# Patient Record
Sex: MALE | Race: WHITE | HISPANIC OR LATINO | Employment: FULL TIME | URBAN - METROPOLITAN AREA
[De-identification: names, ages, dates, MRNs, and addresses within clinical notes are randomized per-mention and may not be internally consistent; named-entity substitution may affect disease eponyms.]

---

## 2017-06-11 ENCOUNTER — HOSPITAL ENCOUNTER (EMERGENCY)
Facility: MEDICAL CENTER | Age: 47
End: 2017-06-12
Attending: EMERGENCY MEDICINE

## 2017-06-11 ENCOUNTER — APPOINTMENT (OUTPATIENT)
Dept: RADIOLOGY | Facility: MEDICAL CENTER | Age: 47
End: 2017-06-11
Attending: EMERGENCY MEDICINE

## 2017-06-11 DIAGNOSIS — S09.90XA HEAD TRAUMA, INITIAL ENCOUNTER: ICD-10-CM

## 2017-06-11 DIAGNOSIS — S00.83XA FACIAL CONTUSION, INITIAL ENCOUNTER: ICD-10-CM

## 2017-06-11 DIAGNOSIS — S01.81XA FOREHEAD LACERATION, INITIAL ENCOUNTER: ICD-10-CM

## 2017-06-11 PROCEDURE — 70486 CT MAXILLOFACIAL W/O DYE: CPT

## 2017-06-11 PROCEDURE — 304999 HCHG REPAIR-SIMPLE/INTERMED LEVEL 1

## 2017-06-11 PROCEDURE — 90715 TDAP VACCINE 7 YRS/> IM: CPT | Performed by: EMERGENCY MEDICINE

## 2017-06-11 PROCEDURE — 303747 HCHG EXTRA SUTURE

## 2017-06-11 PROCEDURE — 70450 CT HEAD/BRAIN W/O DYE: CPT

## 2017-06-11 PROCEDURE — 304217 HCHG IRRIGATION SYSTEM

## 2017-06-11 PROCEDURE — 99284 EMERGENCY DEPT VISIT MOD MDM: CPT

## 2017-06-11 PROCEDURE — 90471 IMMUNIZATION ADMIN: CPT

## 2017-06-11 PROCEDURE — 700111 HCHG RX REV CODE 636 W/ 250 OVERRIDE (IP): Performed by: EMERGENCY MEDICINE

## 2017-06-11 PROCEDURE — 303485 HCHG DRESSING MEDIUM

## 2017-06-11 RX ORDER — HYDROCODONE BITARTRATE AND ACETAMINOPHEN 5; 325 MG/1; MG/1
1-2 TABLET ORAL EVERY 4 HOURS PRN
Qty: 16 TAB | Refills: 0 | Status: SHIPPED | OUTPATIENT
Start: 2017-06-11

## 2017-06-11 RX ADMIN — CLOSTRIDIUM TETANI TOXOID ANTIGEN (FORMALDEHYDE INACTIVATED), CORYNEBACTERIUM DIPHTHERIAE TOXOID ANTIGEN (FORMALDEHYDE INACTIVATED), BORDETELLA PERTUSSIS TOXOID ANTIGEN (GLUTARALDEHYDE INACTIVATED), BORDETELLA PERTUSSIS FILAMENTOUS HEMAGGLUTININ ANTIGEN (FORMALDEHYDE INACTIVATED), BORDETELLA PERTUSSIS PERTACTIN ANTIGEN, AND BORDETELLA PERTUSSIS FIMBRIAE 2/3 ANTIGEN 0.5 ML: 5; 2; 2.5; 5; 3; 5 INJECTION, SUSPENSION INTRAMUSCULAR at 23:09

## 2017-06-11 ASSESSMENT — ENCOUNTER SYMPTOMS
ABDOMINAL PAIN: 0
NECK PAIN: 0
LOSS OF CONSCIOUSNESS: 0

## 2017-06-11 NOTE — ED AVS SNAPSHOT
6/12/2017    Jamari Acuna  [address]  [place] [address]    Dear Jamari:    [facility] wants to ensure your discharge home is safe and you or your loved ones have had all of your questions answered regarding your care after you leave the hospital.    Below is a list of resources and contact information should you have any questions regarding your hospital stay, follow-up instructions, or active medical symptoms.    Questions or Concerns Regarding… Contact   Medical Questions Related to Your Discharge  (7 days a week, 8am-5pm) Contact a Nurse Care Coordinator   [phone]   Medical Questions Not Related to Your Discharge  (24 hours a day / 7 days a week)  Contact the Nurse Health Line   [phone]    Medications or Discharge Instructions Refer to your discharge packet   or contact your [facility] Primary Care Provider   [phone]   Follow-up Appointment(s) Schedule your appointment via [employer]   or contact Scheduling [phone]   Billing Review your statement via [employer]  or contact Billing [phone]   Medical Records Review your records via [employer]   or contact Medical Records [phone]     You may receive a telephone call within two days of discharge. This call is to make certain you understand your discharge instructions and have the opportunity to have any questions answered. You can also easily access your medical information, test results and upcoming appointments via the [employer] free online health management tool. You can learn more and sign up at [email or web address]. For assistance setting up your [employer] account, please call [phone].    Once again, we want to ensure your discharge home is safe and that you have a clear understanding of any next steps in your care. If you have any questions or concerns, please do not hesitate to contact us, we are here for you. Thank you for choosing [facility] for your healthcare needs.    Sincerely,    Your [facility] Healthcare Team

## 2017-06-11 NOTE — ED AVS SNAPSHOT
Home Care Instructions                                                                                                                Jamari Acuna   MRN: [id number]    Department:  [facility], Emergency Dept   Date of Visit:  6/11/2017            [facility], Emergency Dept    [address]    [place] [address]    Phone:  [phone]      You were seen by     Krunal Gonzales M.D.      Your Diagnosis Was     Head trauma, initial encounter     S09.90XA       These are the medications you received during your hospitalization from 06/11/2017 2005 to 06/12/2017 0012     Date/Time Order Dose Route Action    06/11/2017 2309 tetanus-dipth-acell pertussis (ADACEL) inj 0.5 mL 0.5 mL Intramuscular Given      Medication Information     Review all of your home medications and newly ordered medications with your primary doctor and/or pharmacist as soon as possible. Follow medication instructions as directed by your doctor and/or pharmacist.     Please keep your complete medication list with you and share with your physician. Update the information when medications are discontinued, doses are changed, or new medications (including over-the-counter products) are added; and carry medication information at all times in the event of emergency situations.               Medication List      START taking these medications        Instructions    Morning Afternoon Evening Bedtime    hydrocodone-acetaminophen 5-325 MG Tabs per tablet   Commonly known as:  NORCO        Take 1-2 Tabs by mouth every four hours as needed.   Dose:  1-2 Tab                          ASK your doctor about these medications        Instructions    Morning Afternoon Evening Bedtime    citalopram 10 MG tablet   Commonly known as:  CELEXA        Take 10 mg by mouth every day.   Dose:  10 mg                        ranitidine 300 MG tablet   Commonly known as:  ZANTAC        Take 1 Tab by mouth every day.   Dose:  300 mg                           Where to Get Your Medications      You can get these medications from any pharmacy     Bring a paper prescription for each of these medications    - hydrocodone-acetaminophen 5-325 MG Tabs per tablet            Procedures and tests performed during your visit     CT-HEAD W/O    CT-MAXILLOFACIAL W/O PLUS RECONS    WOUND IRRIGATION        Discharge Instructions       Contusión   (Contusion)   Janiya contusión es un hematoma interno. Las contusiones ocurren cuando un traumatismo causa un sangrado debajo de la piel. Los signos de hematoma son dolor, inflamación (hinchazón) y cambio de color en la piel. La contusión puede volverse elva, púrpura o amarilla.  CUIDADOS EN EL HOGAR   · Aplique hielo sobre la rodriguez lesionada.  · Ponga el hielo en janiya bolsa plástica.  · Colóquese janiya toalla entre la piel y la bolsa de hielo.  · Deje el hielo mary 15 a 20 minutos, 3 a 4 veces por día.  · Sólo tome los medicamentos según le indique el médico.  · Alecia que la rodriguez lesionada repose.  · En lo posible, levante (eleve) la rodriguez lesionada para disminuir la hinchazón.  SOLICITE AYUDA DE INMEDIATO SI:   · El hematoma o la hinchazón aumentan.  · Siente que el dolor empeora.  · La hinchazón o el dolor no se alivian con los medicamentos.  ASEGÚRESE DE QUE:   · Comprende estas instrucciones.  · Controlará holt enfermedad.  · Solicitará ayuda de inmediato si no mejora o si empeora.     Esta información no tiene roni fin reemplazar el consejo del médico. Asegúrese de hacerle al médico cualquier pregunta que tenga.     Document Released: 12/06/2012 Document Revised: 03/11/2013  Elsevier Interactive Patient Education ©2016 Elsevier Inc.    Laceración facial  (Facial Laceration)   Janiya laceración facial es un gabriella en el araseli. Estas lesiones pueden ser dolorosas y causan sangrado. Generalmente se curan rápido, deuce puede ser necesario un cuidado especial para reducir las cicatrices.  DIAGNÓSTICO   Holt médico realizará la  historia clínica, preguntará detalles sobre cómo ocurrió la lesión y examinará la herida para determinar cuán profundo es el gabriella.  TRATAMIENTO   Algunas laceraciones faciales no requieren sutura. Otras laceraciones quizás no puedan cerrarse debido a un aumento del riesgo de infección. El riesgo de infección y la probabilidad de que la herida se cierre correctamente dependerán de diversos factores, incluido el tiempo transcurrido desde que ocurrió la lesión.  La herida puede limpiarse para ayudar a prevenir janiya infección. Si la herida se matthew adecuadamente, podrán indicarle analgésicos, si los necesita. Holt médico usará puntos (suturas), pegamento para heridas (adhesivo) o tiras adhesivas para la piel para reparar la laceración. Estos elementos mantendrán unidos los bordes de la piel para que se cure más rápidamente y para obtener un mejor resultado cosmético. Si es necesario, es posible que le administren janiya vacuna contra el tétanos.  INSTRUCCIONES PARA EL CUIDADO EN EL HOGAR  · [place] solo medicamentos de venta darian o recetados, según las indicaciones del médico.  · Siga las indicaciones de holt médico para el cuidado de la herida. Estas indicaciones variarán según la técnica utilizada para cerrar la herida.  Si tiene suturas:  · Mantenga la herida limpia y seca.  · Si le colocaron janiya venda (vendaje), debe cambiarla al menos janiya vez al día. Además, cambie el vendaje si mihaela se moja o se ensucia, o según las instrucciones de holt médico.  · Lave la herida dos veces por día con agua y jabón. Enjuáguela con agua para quitar todo el jabón. Seque dando palmaditas con janiya toalla limpia y seca.  · Después de limpiar, aplique janiya delgada capa del ungüento con antibiótico recomendado por holt médico. [place] le ayudará a prevenir las infecciones y a evitar que el vendaje se adhiera.  · Puede ducharse después de las primeras 24 horas. No moje la herida hasta que le hayan quitado las suturas.  · Quítese las suturas según las  indicaciones de holt médico. Con las laceraciones faciales, las suturas normalmente deben retirarse después de 4 a 5 días para evitar las marcas de los puntos.  · Espere algunos días luego de que le hayan retirado las suturas antes de aplicar maquillaje.  En roni que tenga tiras adhesivas para la piel:  · Mantenga la herida limpia y seca.  · No deje que las tiras adhesivas se mojen. Puede darse un baño deuce asegúrese de que la herida se mantenga seca.  · Si se moja, séquela dando palmaditas con jessica toalla limpia.  · Las tiras caerán por sí mismas. Puede recortar las tiras a medida que la herida se adryan. No quite las tiras adhesivas para la piel que aún estén adheridas a la herida. Ellas se caerán cuando sea el momento.  En roni que le hayan aplicado adhesivo:  · Podrá mojar la herida solo por un memento, en la ducha o el baño. No frote ni sumerja la herida. No practique natación. Evite transpirar con abundancia hasta que el adhesivo para la piel se haya caído solo. Después de ducharse o darse un baño, seque el gabriella dando palmaditas con jessica toalla limpia.  · No aplique medicamentos líquidos, en crema o ungüentos ni maquillaje en holt herida mientras el adhesivo para la piel esté en holt lugar. Podrá aflojarlo antes de que la herida se cure.  · Si tiene un vendaje, tenga cuidado de no aplicar cinta adhesiva directamente sobre el adhesivo. Idana puede hacer que el adhesivo se caiga antes de que la herida se haya curado.  · Evite la exposición prolongada a la usama solar o a las lámparas nicolle mientras el adhesivo para la piel se encuentre en el lugar.  · Por lo general, el adhesivo para la piel permanecerá en holt lugar mary 5 a 10 días y luego caerá naturalmente. No quite la película de adhesivo.  Después de la curación:  Jessica vez que la herida se haya curado, proteja la herida del sol mary un año, colocando pantalla solar mary el día. Idana puede ayudar a reducir las cicatrices. La exposición a los bienvenido ultravioletas  mary el primer año oscurecerá la cicatriz. Pueden transcurrir entre jadiel y dos años hasta que la cicatriz se cure completamente y pierda el color chaney.   SOLICITE ATENCIÓN MÉDICA SI:   · Tiene fiebre.  SOLICITE ATENCIÓN MÉDICA DE INMEDIATO SI:  · Tiene enrojecimiento, dolor o hinchazón alrededor de la herida.  · Observa janiya secreción de color murillo amarillento (pus) en la herida.     Esta información no tiene roni fin reemplazar el consejo del médico. Asegúrese de hacerle al médico cualquier pregunta que tenga.    Suture removal in 4-5 days.  Traumatismo en la bri - Adultos  (Head Injury, Adult)  Tiene janiya lesión en la bri. Después de sufrir janiya lesión en la bri, es normal tener michelle de bri y vomitar. Si se duerme, debería resultar fácil despertarlo. Algunas veces debe permanecer en el hospital. La mayoría de los problemas ocurren mary las primeras 24 horas. Los efectos secundarios pueden aparecer entre los 7 y 10 días posteriores a la lesión.   ¿CUÁLES SON LOS TIPOS DE LESIONES EN LA BRI?  Las lesiones en la bri pueden ser leves y provocar un bulto. Algunas lesiones en la bri pueden ser más graves. Algunas de las lesiones graves en la bri son:  · Lesión que provoque un impacto en el cerebro (conmoción).  · Hematoma en el cerebro (contusión). [place]an[place] significa que hay hemorragia en el cerebro que puede causar un edema.  · Fisura en el cráneo (fractura de cráneo).  · Hemorragia en el cerebro que se acumula, se coagula y forma un bulto (hematoma).  ¿CUÁNDO ZACH OBTENER AYUDA DE INMEDIATO?   · Está confundido o somnoliento.  · No pueden despertarlo.  · Tiene malestar estomacal (náuseas) o vómitos.  · Los mareos o la inestabilidad empeoran.  · Sufre michelle de bri intensos y prolongados que no se alivian con medicamentos. [place] los medicamentos solamente roni se lo haya indicado el médico.  · No puede  los brazos o las piernas normalmente.  · No puede caminar.  · Observa cambios en  los puntos negros en el centro de la parte coloreada del conner (pupila).  · Presenta janiya secreción prosper o con juan que proviene de la nariz o de los oídos.  · Tiene dificultad para maya.  Thea las próximas 24 horas posteriores a la lesión, debe permanecer con alguna persona que pueda cuidarlo. Esta persona debe pedir ayuda de inmediato (llamar al 911 en los [place] si usted empieza a tener temblores y no puede controlarlos (tiene convulsiones), se desmaya o no puede despertarse.  ¿CÓMO PUEDO PREVENIR JANIYA LESIÓN EN LA BRI EN EL FUTURO?  · Use un cinturón de seguridad.  · Use un alison si argenis en bicicleta y practica deportes, roni fútbol americano.  · Evite las actividades peligrosas que puedan realizarse en la casa.  ¿CUÁNDO PUEDO RETOMAR LAS ACTIVIDADES NORMALES Y EL ATLETISMO?  Consulte a holt médico antes de hacer estas actividades. No debe hacer actividades normales ni practicar deportes de contacto hasta 1 semana después de que hayan desaparecido los siguientes síntomas:  · Dolor de bri elliot.  · Mareos.  · Atención deficiente.  · Confusión.  · Problemas de memoria.  · Malestar estomacal o vómitos.  · Cansancio.  · Irritabilidad.  · Intolerancia a la usama brillante o los ruidos lluvia.  · Ansiedad o depresión.  · Sueño agitado.  ASEGÚRESE DE QUE:   · Comprende estas instrucciones.  · Controlará holt afección.  · Recibirá ayuda de inmediato si no mejora o si empeora.     Esta información no tiene roni fin reemplazar el consejo del médico. Asegúrese de hacerle al médico cualquier pregunta que tenga.     Document Released: 10/08/2014 Document Revised: 01/08/2016  Elsevier Interactive Patient Education ©2016 Elsevier Inc.       Document Released: 12/18/2006 Document Revised: 01/08/2016  Elsevier Interactive Patient Education ©2016 Elsevier Inc.            Patient Information     Patient Information    Following emergency treatment: all patient requiring follow-up care must return either to a private  physician or a clinic if your condition worsens before you are able to obtain further medical attention, please return to the emergency room.     Billing Information    At [facility], we work to make the billing process streamlined for our patients.  Our Representatives are here to answer any questions you may have regarding your hospital bill.  If you have insurance coverage and have supplied your insurance information to us, we will submit a claim to your insurer on your behalf.  Should you have any questions regarding your bill, we can be reached online or by phone as follows:  Online: You are able pay your bills online or live chat with our representatives about any billing questions you may have. We are here to help Monday - Friday from 8:00am to 7:30pm and 9:00am - 12:00pm on Saturdays.  Please visit [email or web address]  for more information.   Phone:  [phone] or [phone]    Please note that your emergency physician, surgeon, pathologist, radiologist, anesthesiologist, and other specialists are not employed by [facility] and will therefore bill separately for their services.  Please contact them directly for any questions concerning their bills at the numbers below:     Emergency Physician Services:  [phone]  [facility]:  [phone]  [facility]:  [phone]  [facility]:  [phone]    1. Your final bill may vary from the amount quoted upon discharge if all procedures are not complete at that time, or if your doctor has additional procedures of which we are not aware. You will receive an additional bill if you return to the Emergency Department at [facility] for suture removal regardless of the facility of which the sutures were placed.     2. Please arrange for settlement of this account at the emergency registration.    3. All self-pay accounts are due in full at the time of treatment.  If you are  unable to meet this obligation then payment is expected within 4-5 days.     4. If you have had radiology studies (CT, X-ray, Ultrasound, MRI), you have received a preliminary result during your emergency department visit. Please contact the radiology department [phone] to receive a copy of your final result. Please discuss the Final result with your primary physician or with the follow up physician provided.     Crisis Hotline:  [place] Crisis Hotline:  2-828-LYIAZSI or 1-773.747.9884  [place] Crisis Hotline:    1-994.388.7472 or 493-195-7075         ED Discharge Follow Up Questions    1. In order to provide you with very good care, we would like to follow up with a phone call in the next few days.  May we have your permission to contact you?     YES /  NO    2. What is the best phone number to call you? (       )_____-__________    3. What is the best time to call you?      Morning  /  Afternoon  /  Evening                   Patient Signature:  ____________________________________________________________    Date:  ____________________________________________________________

## 2017-06-11 NOTE — ED AVS SNAPSHOT
PassportParking Access Code: [email or web address]  Expires: 7/12/2017 12:12 AM    Your email address is not on file at [facility].  Email Addresses are required for you to sign up for PassportParking, please contact [phone] to verify your personal information and to provide your email address prior to attempting to register for PassportParking.    Jamari Acuna  [address]  [place] [address]    PassportParking  A secure, online tool to manage your health information     [facility]’s PassportParking® is a secure, online tool that connects you to your personalized health information from the privacy of your home -- day or night - making it very easy for you to manage your healthcare. Once the activation process is completed, you can even access your medical information using the PassportParking pito, which is available for free in the Apple Pito store or Google Play store.     To learn more about PassportParking, visit [email or web address]    There are two levels of access available (as shown below):   My Chart Features  [facility] Primary Care Doctor [facility]  Specialists [facility]  Urgent  Care Non-[facility] Primary Care Doctor   Email your healthcare team securely and privately 24/7 X X X    Manage appointments: schedule your next appointment; view details of past/upcoming appointments X      Request prescription refills. X      View recent personal medical records, including lab and immunizations X X X X   View health record, including health history, allergies, medications X X X X   Read reports about your outpatient visits, procedures, consult and ER notes X X X X   See your discharge summary, which is a recap of your hospital and/or ER visit that includes your diagnosis, lab results, and care plan X X  X     How to register for [employer]:  Once your e-mail address has been verified, follow the following steps to sign up for [employer].     1. Go to  [email or web address]  2. Click on the Sign Up Now box, which takes you to the New Member Sign Up page. You  will need to provide the following information:  a. Enter your [employer] Access Code exactly as it appears at the top of this page. (You will not need to use this code after you’ve completed the sign-up process. If you do not sign up before the expiration date, you must request a new code.)   b. Enter your date of birth.   c. Enter your home email address.   d. Click Submit, and follow the next screen’s instructions.  3. Create a Additecht ID. This will be your [employer] login ID and cannot be changed, so think of one that is secure and easy to remember.  4. Create a [employer] password. You can change your password at any time.  5. Enter your Password Reset Question and Answer. This can be used at a later time if you forget your password.   6. Enter your e-mail address. This allows you to receive e-mail notifications when new information is available in [employer].  7. Click Sign Up. You can now view your health information.    For assistance activating your [employer] account, call [phone]

## 2017-06-12 VITALS
SYSTOLIC BLOOD PRESSURE: 121 MMHG | BODY MASS INDEX: 31.31 KG/M2 | HEIGHT: 64 IN | OXYGEN SATURATION: 95 % | DIASTOLIC BLOOD PRESSURE: 78 MMHG | RESPIRATION RATE: 18 BRPM | WEIGHT: 183.42 LBS | HEART RATE: 81 BPM | TEMPERATURE: 98.1 F
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
## 2017-06-12 NOTE — ED NOTES
Discharge instructions given. All questions answered. Prescriptions given .  Pt verbalizing understanding. All belongings with pt. Pt  Escorted to lobby with his wife.

## 2017-06-12 NOTE — DISCHARGE INSTRUCTIONS
Contusión   (Contusion)   Janiya contusión es un hematoma interno. Las contusiones ocurren cuando un traumatismo causa un sangrado debajo de la piel. Los signos de hematoma son dolor, inflamación (hinchazón) y cambio de color en la piel. La contusión puede volverse elva, púrpura o amarilla.  CUIDADOS EN EL HOGAR   · Aplique hielo sobre la rodriguez lesionada.  · Ponga el hielo en janiya bolsa plástica.  · Colóquese janiya toalla entre la piel y la bolsa de hielo.  · Deje el hielo mary 15 a 20 minutos, 3 a 4 veces por día.  · Sólo tome los medicamentos según le indique el médico.  · Alecia que la rodriguez lesionada repose.  · En lo posible, levante (eleve) la rodriguez lesionada para disminuir la hinchazón.  SOLICITE AYUDA DE INMEDIATO SI:   · El hematoma o la hinchazón aumentan.  · Siente que el dolor empeora.  · La hinchazón o el dolor no se alivian con los medicamentos.  ASEGÚRESE DE QUE:   · Comprende estas instrucciones.  · Controlará holt enfermedad.  · Solicitará ayuda de inmediato si no mejora o si empeora.     Esta información no tiene roni fin reemplazar el consejo del médico. Asegúrese de hacerle al médico cualquier pregunta que tenga.     Document Released: 12/06/2012 Document Revised: 03/11/2013  Elsevier Interactive Patient Education ©2016 Elsevier Inc.    Laceración facial  (Facial Laceration)   Janiya laceración facial es un gabriella en el araseli. Estas lesiones pueden ser dolorosas y causan sangrado. Generalmente se curan rápido, deuce puede ser necesario un cuidado especial para reducir las cicatrices.  DIAGNÓSTICO   Holt médico realizará la historia clínica, preguntará detalles sobre cómo ocurrió la lesión y examinará la herida para determinar cuán profundo es el gabriella.  TRATAMIENTO   Algunas laceraciones faciales no requieren sutura. Otras laceraciones quizás no puedan cerrarse debido a un aumento del riesgo de infección. El riesgo de infección y la probabilidad de que la herida se cierre correctamente dependerán de diversos  factores, incluido el tiempo transcurrido desde que ocurrió la lesión.  La herida puede limpiarse para ayudar a prevenir janiya infección. Si la herida se matthew adecuadamente, podrán indicarle analgésicos, si los necesita. Holt médico usará puntos (suturas), pegamento para heridas (adhesivo) o tiras adhesivas para la piel para reparar la laceración. Estos elementos mantendrán unidos los bordes de la piel para que se cure más rápidamente y para obtener un mejor resultado cosmético. Si es necesario, es posible que le administren janiya vacuna contra el tétanos.  INSTRUCCIONES PARA EL CUIDADO EN EL HOGAR  · [place] solo medicamentos de venta darian o recetados, según las indicaciones del médico.  · Siga las indicaciones de holt médico para el cuidado de la herida. Estas indicaciones variarán según la técnica utilizada para cerrar la herida.  Si tiene suturas:  · Mantenga la herida limpia y seca.  · Si le colocaron janiya venda (vendaje), debe cambiarla al menos janiya vez al día. Además, cambie el vendaje si mihaela se moja o se ensucia, o según las instrucciones de holt médico.  · Lave la herida dos veces por día con agua y jabón. Enjuáguela con agua para quitar todo el jabón. Seque dando palmaditas con janiya toalla limpia y seca.  · Después de limpiar, aplique janiya delgada capa del ungüento con antibiótico recomendado por holt médico. [place] le ayudará a prevenir las infecciones y a evitar que el vendaje se adhiera.  · Puede ducharse después de las primeras 24 horas. No moje la herida hasta que le hayan quitado las suturas.  · Quítese las suturas según las indicaciones de holt médico. Con las laceraciones faciales, las suturas normalmente deben retirarse después de 4 a 5 días para evitar las marcas de los puntos.  · Espere algunos días luego de que le hayan retirado las suturas antes de aplicar maquillaje.  En roni que tenga tiras adhesivas para la piel:  · Mantenga la herida limpia y seca.  · No deje que las tiras adhesivas se mojen. Puede darse un  baño deuce asegúrese de que la herida se mantenga seca.  · Si se moja, séquela dando palmaditas con janiya toalla limpia.  · Las tiras caerán por sí mismas. Puede recortar las tiras a medida que la herida se adryan. No quite las tiras adhesivas para la piel que aún estén adheridas a la herida. Ellas se caerán cuando sea el momento.  En roni que le hayan aplicado adhesivo:  · Podrá mojar la herida solo por un memento, en la ducha o el baño. No frote ni sumerja la herida. No practique natación. Evite transpirar con abundancia hasta que el adhesivo para la piel se haya caído solo. Después de ducharse o darse un baño, seque el gabriella dando palmaditas con janiya toalla limpia.  · No aplique medicamentos líquidos, en crema o ungüentos ni maquillaje en holt herida mientras el adhesivo para la piel esté en holt lugar. Podrá aflojarlo antes de que la herida se cure.  · Si tiene un vendaje, tenga cuidado de no aplicar cinta adhesiva directamente sobre el adhesivo. [place] puede hacer que el adhesivo se caiga antes de que la herida se haya curado.  · Evite la exposición prolongada a la usama solar o a las lámparas nicolle mientras el adhesivo para la piel se encuentre en el lugar.  · Por lo general, el adhesivo para la piel permanecerá en holt lugar mary 5 a 10 días y luego caerá naturalmente. No quite la película de adhesivo.  Después de la curación:  Janiya vez que la herida se haya curado, proteja la herida del sol mary un año, colocando pantalla solar mary el día. [place] puede ayudar a reducir las cicatrices. La exposición a los bienvenido ultravioletas mary el primer año oscurecerá la cicatriz. Pueden transcurrir entre jadiel y dos años hasta que la cicatriz se cure completamente y pierda el color chaney.   SOLICITE ATENCIÓN MÉDICA SI:   · Tiene fiebre.  SOLICITE ATENCIÓN MÉDICA DE INMEDIATO SI:  · Tiene enrojecimiento, dolor o hinchazón alrededor de la herida.  · Observa janiya secreción de color murillo amarillento (pus) en la herida.     Esta  información no tiene roni fin reemplazar el consejo del médico. Asegúrese de hacerle al médico cualquier pregunta que tenga.    Suture removal in 4-5 days.  Traumatismo en la bri - Adultos  (Head Injury, Adult)  Tiene janiya lesión en la bri. Después de sufrir janiya lesión en la bri, es normal tener michelle de bri y vomitar. Si se duerme, debería resultar fácil despertarlo. Algunas veces debe permanecer en el hospital. La mayoría de los problemas ocurren mary las primeras 24 horas. Los efectos secundarios pueden aparecer entre los 7 y 10 días posteriores a la lesión.   ¿CUÁLES SON LOS TIPOS DE LESIONES EN LA BRI?  Las lesiones en la bri pueden ser leves y provocar un bulto. Algunas lesiones en la bri pueden ser más graves. Algunas de las lesiones graves en la bri son:  · Lesión que provoque un impacto en el cerebro (conmoción).  · Hematoma en el cerebro (contusión). [place] significa que hay hemorragia en el cerebro que puede causar un edema.  · Fisura en el cráneo (fractura de cráneo).  · Hemorragia en el cerebro que se acumula, se coagula y forma un bulto (hematoma).  ¿CUÁNDO ZACH OBTENER AYUDA DE INMEDIATO?   · Está confundido o somnoliento.  · No pueden despertarlo.  · Tiene malestar estomacal (náuseas) o vómitos.  · Los mareos o la inestabilidad empeoran.  · Sufre michelle de bri intensos y prolongados que no se alivian con medicamentos. [place] los medicamentos solamente roni se lo haya indicado el médico.  · No puede  los brazos o las piernas normalmente.  · No puede caminar.  · Observa cambios en los puntos negros en el centro de la parte coloreada del conner (pupila).  · Presenta janiya secreción prosper o con juan que proviene de la nariz o de los oídos.  · Tiene dificultad para maya.  Mary las próximas 24 horas posteriores a la lesión, debe permanecer con alguna persona que pueda cuidarlo. Esta persona debe pedir ayuda de inmediato (llamar al 911 en los [place] si usted empieza a tener  temblores y no puede controlarlos (tiene convulsiones), se desmaya o no puede despertarse.  ¿CÓMO PUEDO PREVENIR BRUNILDA LESIÓN EN LA BRI EN EL FUTURO?  · Use un cinturón de seguridad.  · Use un alison si argenis en bicicleta y practica deportes, roni fútbol americano.  · Evite las actividades peligrosas que puedan realizarse en la casa.  ¿CUÁNDO PUEDO RETOMAR LAS ACTIVIDADES NORMALES Y EL ATLETISMO?  Consulte a holt médico antes de hacer estas actividades. No debe hacer actividades normales ni practicar deportes de contacto hasta 1 semana después de que hayan desaparecido los siguientes síntomas:  · Dolor de bri elliot.  · Mareos.  · Atención deficiente.  · Confusión.  · Problemas de memoria.  · Malestar estomacal o vómitos.  · Cansancio.  · Irritabilidad.  · Intolerancia a la usama brillante o los ruidos lluvia.  · Ansiedad o depresión.  · Sueño agitado.  ASEGÚRESE DE QUE:   · Comprende estas instrucciones.  · Controlará holt afección.  · Recibirá ayuda de inmediato si no mejora o si empeora.     Esta información no tiene roni fin reemplazar el consejo del médico. Asegúrese de hacerle al médico cualquier pregunta que tenga.     Document Released: 10/08/2014 Document Revised: 01/08/2016  Elsevier Interactive Patient Education ©2016 [employer]      Suture removal in 4-5 days.  Document Released: 12/18/2006 Document Revised: 01/08/2016  [employer] Interactive Patient Education ©2016 [employer]

## 2017-06-16 ENCOUNTER — HOSPITAL ENCOUNTER (EMERGENCY)
Facility: MEDICAL CENTER | Age: 47
End: 2017-06-17
Attending: EMERGENCY MEDICINE

## 2017-06-16 ENCOUNTER — APPOINTMENT (OUTPATIENT)
Dept: RADIOLOGY | Facility: MEDICAL CENTER | Age: 47
End: 2017-06-16
Attending: EMERGENCY MEDICINE

## 2017-06-16 VITALS
WEIGHT: 178.57 LBS | HEART RATE: 86 BPM | TEMPERATURE: 98.7 F | DIASTOLIC BLOOD PRESSURE: 67 MMHG | HEIGHT: 64 IN | BODY MASS INDEX: 30.49 KG/M2 | SYSTOLIC BLOOD PRESSURE: 143 MMHG | OXYGEN SATURATION: 97 % | RESPIRATION RATE: 20 BRPM

## 2017-06-16 DIAGNOSIS — Z48.02 VISIT FOR SUTURE REMOVAL: ICD-10-CM

## 2017-06-16 DIAGNOSIS — S20.211A RIB CONTUSION, RIGHT, INITIAL ENCOUNTER: ICD-10-CM

## 2017-06-16 PROCEDURE — 71101 X-RAY EXAM UNILAT RIBS/CHEST: CPT | Mod: RT

## 2017-06-16 PROCEDURE — 700102 HCHG RX REV CODE 250 W/ 637 OVERRIDE(OP): Performed by: EMERGENCY MEDICINE

## 2017-06-16 PROCEDURE — 99283 EMERGENCY DEPT VISIT LOW MDM: CPT

## 2017-06-16 PROCEDURE — A9270 NON-COVERED ITEM OR SERVICE: HCPCS | Performed by: EMERGENCY MEDICINE

## 2017-06-16 RX ORDER — NAPROXEN 250 MG/1
500 TABLET ORAL ONCE
Status: COMPLETED | OUTPATIENT
Start: 2017-06-16 | End: 2017-06-16

## 2017-06-16 RX ADMIN — NAPROXEN 500 MG: 250 TABLET ORAL at 22:28

## 2017-06-16 ASSESSMENT — PAIN SCALES - GENERAL: PAINLEVEL_OUTOF10: 5

## 2017-06-16 NOTE — ED AVS SNAPSHOT
Home Care Instructions                                                                                                                Jamari Acuna   MRN: [id number]    Department:  [facility], Emergency Dept   Date of Visit:  6/16/2017            [facility], Emergency Dept    [address]    [place] [id number]    Phone:  [phone]      You were seen by     Isaac Dove M.D.      Your Diagnosis Was     Rib contusion, right, initial encounter     S20.211A       These are the medications you received during your hospitalization from 06/16/2017 2203 to 06/16/2017 2357     Date/Time Order Dose Route Action    06/16/2017 2228 naproxen (NAPROSYN) tablet 500 mg 500 mg Oral Given      Medication Information     Review all of your home medications and newly ordered medications with your primary doctor and/or pharmacist as soon as possible. Follow medication instructions as directed by your doctor and/or pharmacist.     Please keep your complete medication list with you and share with your physician. Update the information when medications are discontinued, doses are changed, or new medications (including over-the-counter products) are added; and carry medication information at all times in the event of emergency situations.               Medication List      ASK your doctor about these medications        Instructions    Morning Afternoon Evening Bedtime    citalopram 10 MG tablet   Commonly known as:  CELEXA        Take 10 mg by mouth every day.   Dose:  10 mg                        hydrocodone-acetaminophen 5-325 MG Tabs per tablet   Commonly known as:  NORCO        Take 1-2 Tabs by mouth every four hours as needed.   Dose:  1-2 Tab                        ranitidine 300 MG tablet   Commonly known as:  ZANTAC        Take 1 Tab by mouth every day.   Dose:  300 mg                                Procedures and tests performed during your visit     [id number] (WITH 1-VIEW CXR) RIGHT        Discharge Instructions       Your x-ray was normal. You don't have any rib fractures. Take motrin or tylenol for pain. You will gradually improve.    Contusión en el tórax   (Chest Contusion)   Janiya contusión es un hematoma profundo. Las contusiones ocurren cuando janiya lesión provoca un sangrado debajo de la piel. Los signos de hematoma son dolor, inflamación (hinchazón) y cambio de color en la piel. La rodriguez de la contusión puede ponerse elva, morada o amarilla.   CUIDADOS EN EL HOGAR   · Aplique hielo sobre la rodriguez lesionada.  ¨ Ponga el hielo en janiya bolsa plástica.  ¨ Colóquese janiya toalla entre la piel y la bolsa de hielo.  ¨ Deje el hielo mary 15 a 20 minutos por vez 3 a 4 veces por día, mary las primeras 48 horas.  · Sólo debe abram la medicación según las indicaciones del médico.  · Alecia reposo.  · Respire profundamente (ejercicios de respiración profunda) según lo indicado por holt médico.  · Si fuma, abandone el hábito.  · No levante objetos de más de 5 libras (2.3 kg) mary 3 días o más si se lo indica holt médico.  SOLICITE AYUDA DE INMEDIATO SI:   · Tiene más moretones o hinchazón.  · Siente un dolor que empeora.  · Tiene dificultad para respirar.  · Se siente mareado, débil o se desmaya (se desvanece).  · Observa juan en el pis (orina) o en la materia fecal (heces).  · Tose o vomita juan.  · La hinchazón o el dolor no se alivian con los medicamentos.  ASEGÚRESE DE QUE:   · Comprende estas instrucciones.  · Controlará holt enfermedad.  · Solicitará ayuda de inmediato si no mejora o si empeora.     Esta información no tiene roni fin reemplazar el consejo del médico. Asegúrese de hacerle al médico cualquier pregunta que tenga.     Document Released: 09/11/2013  [employer] Interactive Patient Education ©2016 Elsevier Inc.            Patient Information     Patient Information    Following emergency treatment: all patient requiring follow-up care must return  either to a private physician or a clinic if your condition worsens before you are able to obtain further medical attention, please return to the emergency room.     Billing Information    At [facility], we work to make the billing process streamlined for our patients.  Our Representatives are here to answer any questions you may have regarding your hospital bill.  If you have insurance coverage and have supplied your insurance information to us, we will submit a claim to your insurer on your behalf.  Should you have any questions regarding your bill, we can be reached online or by phone as follows:  Online: You are able pay your bills online or live chat with our representatives about any billing questions you may have. We are here to help Monday - Friday from 8:00am to 7:30pm and 9:00am - 12:00pm on Saturdays.  Please visit [email or web address]  for more information.   Phone:  [phone] or [phone]    Please note that your emergency physician, surgeon, pathologist, radiologist, anesthesiologist, and other specialists are not employed by [facility] and will therefore bill separately for their services.  Please contact them directly for any questions concerning their bills at the numbers below:     Emergency Physician Services:  [phone]  [facility]:  [phone]  [facility]:  [phone]  [facility]:  [phone]    1. Your final bill may vary from the amount quoted upon discharge if all procedures are not complete at that time, or if your doctor has additional procedures of which we are not aware. You will receive an additional bill if you return to the Emergency Department at [facility] for suture removal regardless of the facility of which the sutures were placed.     2. Please arrange for settlement of this account at the emergency registration.    3. All self-pay accounts are due in full at the time of  treatment.  If you are unable to meet this obligation then payment is expected within 4-5 days.     4. If you have had radiology studies (CT, X-ray, Ultrasound, MRI), you have received a preliminary result during your emergency department visit. Please contact the radiology department [phone] to receive a copy of your final result. Please discuss the Final result with your primary physician or with the follow up physician provided.     Crisis Hotline:  [place] Crisis Hotline:  8-360-XDFZORY or 1-328.952.2270  [place] Crisis Hotline:    1-724.127.2465 or 279-517-1018         ED Discharge Follow Up Questions    1. In order to provide you with very good care, we would like to follow up with a phone call in the next few days.  May we have your permission to contact you?     YES /  NO    2. What is the best phone number to call you? (       )_____-__________    3. What is the best time to call you?      Morning  /  Afternoon  /  Evening                   Patient Signature:  ____________________________________________________________    Date:  ____________________________________________________________

## 2017-06-16 NOTE — ED AVS SNAPSHOT
Proteopure Access Code: [email or web address]  Expires: 7/12/2017 12:12 AM    Your email address is not on file at [facility].  Email Addresses are required for you to sign up for Proteopure, please contact [phone] to verify your personal information and to provide your email address prior to attempting to register for Proteopure.    Jamari Acuna  [address]  [place] [address]    Proteopure  A secure, online tool to manage your health information     [facility]’s Proteopure® is a secure, online tool that connects you to your personalized health information from the privacy of your home -- day or night - making it very easy for you to manage your healthcare. Once the activation process is completed, you can even access your medical information using the Proteopure pito, which is available for free in the Apple Pito store or Google Play store.     To learn more about Proteopure, visit [email or web address]    There are two levels of access available (as shown below):   My Chart Features  [facility] Primary Care Doctor [facility]  Specialists [facility]  Urgent  Care Non-[facility] Primary Care Doctor   Email your healthcare team securely and privately 24/7 X X X    Manage appointments: schedule your next appointment; view details of past/upcoming appointments X      Request prescription refills. X      View recent personal medical records, including lab and immunizations X X X X   View health record, including health history, allergies, medications X X X X   Read reports about your outpatient visits, procedures, consult and ER notes X X X X   See your discharge summary, which is a recap of your hospital and/or ER visit that includes your diagnosis, lab results, and care plan X X  X     How to register for [employer]:  Once your e-mail address has been verified, follow the following steps to sign up for [employer].     1. Go to  [email or web address]  2. Click on the Sign Up Now box, which takes you to the New Member Sign Up page. You  will need to provide the following information:  a. Enter your BookBub Access Code exactly as it appears at the top of this page. (You will not need to use this code after you’ve completed the sign-up process. If you do not sign up before the expiration date, you must request a new code.)   b. Enter your date of birth.   c. Enter your home email address.   d. Click Submit, and follow the next screen’s instructions.  3. Create a Enlightened Lifestylet ID. This will be your BookBub login ID and cannot be changed, so think of one that is secure and easy to remember.  4. Create a BookBub password. You can change your password at any time.  5. Enter your Password Reset Question and Answer. This can be used at a later time if you forget your password.   6. Enter your e-mail address. This allows you to receive e-mail notifications when new information is available in BookBub.  7. Click Sign Up. You can now view your health information.    For assistance activating your BookBub account, call [phone]

## 2017-06-16 NOTE — ED AVS SNAPSHOT
6/16/2017    Jamari Acuna  [address]  [place] [address]    Dear Jamari:    [facility] wants to ensure your discharge home is safe and you or your loved ones have had all of your questions answered regarding your care after you leave the hospital.    Below is a list of resources and contact information should you have any questions regarding your hospital stay, follow-up instructions, or active medical symptoms.    Questions or Concerns Regarding… Contact   Medical Questions Related to Your Discharge  (7 days a week, 8am-5pm) Contact a Nurse Care Coordinator   [phone]   Medical Questions Not Related to Your Discharge  (24 hours a day / 7 days a week)  Contact the Nurse Health Line   [phone]    Medications or Discharge Instructions Refer to your discharge packet   or contact your [facility] Primary Care Provider   [phone]   Follow-up Appointment(s) Schedule your appointment via [employer]   or contact Scheduling [phone]   Billing Review your statement via [employer]  or contact Billing [phone]   Medical Records Review your records via [employer]   or contact Medical Records [phone]     You may receive a telephone call within two days of discharge. This call is to make certain you understand your discharge instructions and have the opportunity to have any questions answered. You can also easily access your medical information, test results and upcoming appointments via the [employer] free online health management tool. You can learn more and sign up at Fancloud/[employer]. For assistance setting up your [employer] account, please call [phone].    Once again, we want to ensure your discharge home is safe and that you have a clear understanding of any next steps in your care. If you have any questions or concerns, please do not hesitate to contact us, we are here for you. Thank you for choosing [facility] for your healthcare needs.    Sincerely,    Your [facility] Healthcare Team

## 2017-06-17 NOTE — DISCHARGE INSTRUCTIONS
Your x-ray was normal. You don't have any rib fractures. Take motrin or tylenol for pain. You will gradually improve.    Contusión en el tórax   (Chest Contusion)   Janiya contusión es un hematoma profundo. Las contusiones ocurren cuando janiya lesión provoca un sangrado debajo de la piel. Los signos de hematoma son dolor, inflamación (hinchazón) y cambio de color en la piel. La rodriguez de la contusión puede ponerse elva, morada o amarilla.   CUIDADOS EN EL HOGAR   · Aplique hielo sobre la rodriguez lesionada.  ¨ Ponga el hielo en janiya bolsa plástica.  ¨ Colóquese janiya toalla entre la piel y la bolsa de hielo.  ¨ Deje el hielo mary 15 a 20 minutos por vez 3 a 4 veces por día, mary las primeras 48 horas.  · Sólo debe abram la medicación según las indicaciones del médico.  · Alecia reposo.  · Respire profundamente (ejercicios de respiración profunda) según lo indicado por holt médico.  · Si fuma, abandone el hábito.  · No levante objetos de más de 5 libras (2.3 kg) mary 3 días o más si se lo indica holt médico.  SOLICITE AYUDA DE INMEDIATO SI:   · Tiene más moretones o hinchazón.  · Siente un dolor que empeora.  · Tiene dificultad para respirar.  · Se siente mareado, débil o se desmaya (se desvanece).  · Observa juan en el pis (orina) o en la materia fecal (heces).  · Tose o vomita juan.  · La hinchazón o el dolor no se alivian con los medicamentos.  ASEGÚRESE DE QUE:   · Comprende estas instrucciones.  · Controlará holt enfermedad.  · Solicitará ayuda de inmediato si no mejora o si empeora.     Esta información no tiene roni fin reemplazar el consejo del médico. Asegúrese de hacerle al médico cualquier pregunta que tenga.     Document Released: 09/11/2013  Elsevier Interactive Patient Education ©2016 Elsevier Inc.
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
## 2017-06-17 NOTE — ED NOTES
Pt D/C to home. D/C instructions given to patient. Pt instructed to take tylenol & motrin for any pain or discomfort. Pt verbalizes understanding. Pt leaves ED with no acute changes, complaints or concerns.
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
## 2020-02-19 ENCOUNTER — HOSPITAL ENCOUNTER (OUTPATIENT)
Dept: RADIOLOGY | Facility: MEDICAL CENTER | Age: 50
End: 2020-02-19
Attending: UROLOGY
Payer: COMMERCIAL

## 2020-02-19 DIAGNOSIS — N20.0 CALCULUS OF KIDNEY: ICD-10-CM

## 2020-02-19 PROCEDURE — 74018 RADEX ABDOMEN 1 VIEW: CPT

## 2020-02-24 ENCOUNTER — HOSPITAL ENCOUNTER (OUTPATIENT)
Dept: RADIOLOGY | Facility: MEDICAL CENTER | Age: 50
End: 2020-02-24
Attending: UROLOGY
Payer: COMMERCIAL

## 2020-02-24 DIAGNOSIS — N20.0 KIDNEY STONE: ICD-10-CM

## 2020-02-24 PROCEDURE — 74176 CT ABD & PELVIS W/O CONTRAST: CPT

## 2024-09-17 ENCOUNTER — HOSPITAL ENCOUNTER (OUTPATIENT)
Dept: LAB | Facility: MEDICAL CENTER | Age: 54
End: 2024-09-17
Attending: STUDENT IN AN ORGANIZED HEALTH CARE EDUCATION/TRAINING PROGRAM
Payer: COMMERCIAL

## 2024-09-17 ENCOUNTER — OFFICE VISIT (OUTPATIENT)
Dept: RHEUMATOLOGY | Facility: MEDICAL CENTER | Age: 54
End: 2024-09-17
Attending: STUDENT IN AN ORGANIZED HEALTH CARE EDUCATION/TRAINING PROGRAM
Payer: COMMERCIAL

## 2024-09-17 VITALS
HEIGHT: 65 IN | DIASTOLIC BLOOD PRESSURE: 76 MMHG | WEIGHT: 179 LBS | SYSTOLIC BLOOD PRESSURE: 132 MMHG | TEMPERATURE: 98.1 F | BODY MASS INDEX: 29.82 KG/M2 | OXYGEN SATURATION: 97 % | HEART RATE: 73 BPM

## 2024-09-17 DIAGNOSIS — M25.50 ARTHRALGIA OF MULTIPLE JOINTS: ICD-10-CM

## 2024-09-17 DIAGNOSIS — R76.8 SEROLOGIC AUTOIMMUNITY: Primary | ICD-10-CM

## 2024-09-17 DIAGNOSIS — R76.8 SEROLOGIC AUTOIMMUNITY: ICD-10-CM

## 2024-09-17 LAB
BASOPHILS # BLD AUTO: 0.6 % (ref 0–1.8)
BASOPHILS # BLD: 0.04 K/UL (ref 0–0.12)
EOSINOPHIL # BLD AUTO: 0.13 K/UL (ref 0–0.51)
EOSINOPHIL NFR BLD: 1.9 % (ref 0–6.9)
ERYTHROCYTE [DISTWIDTH] IN BLOOD BY AUTOMATED COUNT: 40.9 FL (ref 35.9–50)
HCT VFR BLD AUTO: 51.8 % (ref 42–52)
HGB BLD-MCNC: 18 G/DL (ref 14–18)
IMM GRANULOCYTES # BLD AUTO: 0.03 K/UL (ref 0–0.11)
IMM GRANULOCYTES NFR BLD AUTO: 0.4 % (ref 0–0.9)
LYMPHOCYTES # BLD AUTO: 1.44 K/UL (ref 1–4.8)
LYMPHOCYTES NFR BLD: 20.5 % (ref 22–41)
MCH RBC QN AUTO: 31.9 PG (ref 27–33)
MCHC RBC AUTO-ENTMCNC: 34.7 G/DL (ref 32.3–36.5)
MCV RBC AUTO: 91.7 FL (ref 81.4–97.8)
MONOCYTES # BLD AUTO: 0.48 K/UL (ref 0–0.85)
MONOCYTES NFR BLD AUTO: 6.8 % (ref 0–13.4)
NEUTROPHILS # BLD AUTO: 4.9 K/UL (ref 1.82–7.42)
NEUTROPHILS NFR BLD: 69.8 % (ref 44–72)
NRBC # BLD AUTO: 0 K/UL
NRBC BLD-RTO: 0 /100 WBC (ref 0–0.2)
PLATELET # BLD AUTO: 274 K/UL (ref 164–446)
PMV BLD AUTO: 10.6 FL (ref 9–12.9)
RBC # BLD AUTO: 5.65 M/UL (ref 4.7–6.1)
WBC # BLD AUTO: 7 K/UL (ref 4.8–10.8)

## 2024-09-17 PROCEDURE — 86376 MICROSOMAL ANTIBODY EACH: CPT

## 2024-09-17 PROCEDURE — 85025 COMPLETE CBC W/AUTO DIFF WBC: CPT

## 2024-09-17 PROCEDURE — 86015 ACTIN ANTIBODY EACH: CPT

## 2024-09-17 PROCEDURE — 99202 OFFICE O/P NEW SF 15 MIN: CPT | Performed by: STUDENT IN AN ORGANIZED HEALTH CARE EDUCATION/TRAINING PROGRAM

## 2024-09-17 PROCEDURE — 99204 OFFICE O/P NEW MOD 45 MIN: CPT | Performed by: STUDENT IN AN ORGANIZED HEALTH CARE EDUCATION/TRAINING PROGRAM

## 2024-09-17 PROCEDURE — 86381 MITOCHONDRIAL ANTIBODY EACH: CPT

## 2024-09-17 PROCEDURE — 86225 DNA ANTIBODY NATIVE: CPT

## 2024-09-17 PROCEDURE — 86235 NUCLEAR ANTIGEN ANTIBODY: CPT | Mod: 91

## 2024-09-17 PROCEDURE — 80053 COMPREHEN METABOLIC PANEL: CPT

## 2024-09-17 PROCEDURE — 86039 ANTINUCLEAR ANTIBODIES (ANA): CPT

## 2024-09-17 PROCEDURE — 86160 COMPLEMENT ANTIGEN: CPT | Mod: 91

## 2024-09-17 PROCEDURE — 3078F DIAST BP <80 MM HG: CPT | Performed by: STUDENT IN AN ORGANIZED HEALTH CARE EDUCATION/TRAINING PROGRAM

## 2024-09-17 PROCEDURE — 3075F SYST BP GE 130 - 139MM HG: CPT | Performed by: STUDENT IN AN ORGANIZED HEALTH CARE EDUCATION/TRAINING PROGRAM

## 2024-09-17 PROCEDURE — 83516 IMMUNOASSAY NONANTIBODY: CPT

## 2024-09-17 PROCEDURE — 36415 COLL VENOUS BLD VENIPUNCTURE: CPT

## 2024-09-17 RX ORDER — CYCLOBENZAPRINE HCL 5 MG
TABLET ORAL
COMMUNITY
Start: 2024-07-01 | End: 2024-09-21

## 2024-09-17 RX ORDER — CITALOPRAM HYDROBROMIDE 20 MG/1
20 TABLET ORAL DAILY
COMMUNITY

## 2024-09-17 NOTE — PATIENT INSTRUCTIONS
Thank you for visiting our clinic today.     Summary of your visit:    Your symptoms do not fit with a diagnosis of lupus at this time. However, the test needs to be repeated for confirmation. There are other conditions that can cause a positive JENNIFER test (autoimmune test) which I am checking today. The repeat JENNIFER test can take up to 1 week to result so I will reach out to you if anything is abnormal and if you need to come back for re-evaluation.     Follow up JO ANN    [facility] RHEUMATOLOGY AFTER VISIT GUIDE  Below are important guidelines to help you navigate your health care needs and assist us in caring for you safely and  effectively. We encourage you to carefully read and understand this information and adhere to them accordingly.    [employer] Messaging and Phone Calls:   Diagnosis and Treatment - For a detailed explanation of your condition and treatment plan from today’s visit, refer  to the visit note on [employer]c via the following steps:  o Log in to [employer] and click on “Visits” at the top.  o Scroll down to “Past Visits” under Appointments.  o Click on “View Notes” under the appropriate visit date.   Questions or Concerns - Subitec messaging is for non-urgent matters that do not require immediate attention and  should be brief with no more than two questions or concerns. If you have multiple questions or concerns, we ask that  you schedule an appointment to have them properly addressed.   Response to Messages - [employer]c messages are addressed throughout the week depending on clinical availability,  so we ask that you allow up to one week for a response.   Phone Calls and Voicemails - Phone calls and voicemail messages are reserved for time-sensitive matters that  cannot wait to be addressed via [employer]. We ask that you refrain from calling the office multiple times or leaving  multiple voicemails regarding the same issue as doing so may lead to delays in response time.   Urgent Issues - For urgent medical matters  or medical emergencies that cannot wait, you are advised to go to your  nearest Urgent Care or Emergency Department for immediate attention.    Laboratory Tests and Imaging Studies:   Future Lab and Imaging Orders - We ask that you get your lab tests and imaging studies done no later than one  week before your follow-up visit unless instructed otherwise.   Results Communication - You may see some test results marked as “abnormal” that are not necessarily significant  or concerning. If there are significant abnormalities on your test results that warrant further action, you will be notified  via MyChart or phone call, otherwise they will be addressed at your follow-up visit.    Prescriptions and Refill Requests:   General Prescriptions (e.g. prednisone, hydroxychloroquine, leflunomide, methotrexate, etc.) - These are sent  to Retail Pharmacies, so all refill requests of these medications should be directed to your local pharmacy.   Specialty Prescriptions (e.g. Enbrel, Humira, Cosentyx, Xeljanz, etc.) - These are sent to Specialty Pharmacies,  so all refill requests of these medications should be directed to your designated specialty pharmacy.   Infusion Prescriptions (e.g. Remicade, Simponi Aria, Rituxan, Saphnelo, etc.) - These are sent to Outpatient  Infusion Centers, so all scheduling requests of these medications should be directed to your local infusion center.    Medication Risks and Adverse Effects:   Immunosuppressed Status - Steroids and antirheumatic drugs are immunosuppressants, so they increase the risk  of infections and can have side effects on various organ systems in your body, though most of them are uncommon.   Potential Side Effects - Be sure to read the drug package inserts to learn about the potential side effects of your  medications before you start taking them and take them exactly as prescribed unless instructed otherwise.   In Case of Side Effects - If you experience any significant side  effects, stop taking the medication immediately and  promptly notify the prescriber. Depending on the severity of the side effects, consider going to an Urgent Care or  Emergency Department for immediate attention.    Immunizations and Health Screening:   Vaccinations - If you are on immunosuppressive therapy, it is important that you are up to date on age-appropriate  immunizations, particularly shingles and pneumonia vaccines, which you can request from your primary care provider  or from us at your next appointment.   Screening Tests - It is also important that you are up to date on age-appropriate screening tests, such as pap  smear, mammography, and colonoscopy, which you can request from your primary care provider.    Educational and Supportive Resources:   [employer] Rheumatology ([email or web address]-[email or web address]) - Visit our website to learn more about  your condition and other rheumatic diseases, and gain access to many helpful resources for them.   Disposal of Old Medications (www.amina.gov/everyday-takeback-day) - Visit the Drug Enforcement Administration  website to find a nearby location where you can properly dispose of old medications you no longer need.   Disposal of Used [place] (www.safeneedledisposal.org) - Visit the Safe Needle Disposal Organization website to  find a nearby location where you can properly dispose of used needles from your injectable medications.  Revised 6/14/2024

## 2024-09-17 NOTE — PROGRESS NOTES
[facility] RHEUMATOLOGY  [address] [facility], [address], [place] [address]  Phone: [phone] ? Fax: [phone]  [facility]/Health-Services/Rheumatology    NEW PATIENT VISIT NOTE      DATE OF SERVICE: 09/17/2024         Subjective     REFERRING PRACTITIONER:  MANOJ Nelson  [address]  [address]  [place] [address]    PATIENT IDENTIFICATION:  Jamari Pierce  [place] [address]    YOB: 1970    MEDICAL RECORD NUMBER: [id number]         CHIEF COMPLAINT:   Chief Complaint   Patient presents with    New Patient     JENNIFER positive       HISTORY OF PRESENT ILLNESS:  Jamari Acuna is a 54 y.o. male with pertinent history notable for HTN, HLD, nephrolithiasis, and anxiety, who presents for rheumatologic evaluation in the setting of positive JENNIFER.     Reports a 1 year history of mid back/flank pain (more right-sided), aggravated by increased consumption of alcoholic beverages such as beer, stress, or when he works too much.  Pain in the back can sometimes radiate to the right shoulder.  He has also noticed some facial swelling and intermittent numbness in bilateral 2-4 fingers mostly at night.  In addition to the above, has abdominal bloating so was prescribed omeprazole which he takes as needed.  Denies joint swelling or morning stiffness.  Denies Raynaud's, esophageal disease, photosensitive/malar rash, history of stroke, DVT/PE, sicca symptoms, episodes of red painful photophobic eyes, nasal/genital/nonpainful oral ulcerations, nonscarring alopecia, pleuritic chest pains, dyspnea on exertion, or psoriasis.  He does not recall any illness prior to JENNIFER test. He works as a cook, drinks beer but limits the amount, and no smoking history. No family history of autoimmune rheumatic diseases.    Reports other aspects of symptoms and medical history as noted on the questionnaire below or scanned under media tab.    Pertinent treatments: Cyclobenzaprine prn  Pertinent  positive labs: 2024, JENNIFER, anti-dsDNA (22)  Pertinent negative labs: 2024, anti-SM, anti-SM/RNP, anti-RNP, anti-SCL 70, anti-SSA/SSB, antichromatin, antiribosomal P, anti-Jo1, anticentromere, HLA-B27 RF/anti-CCP, CRP; 2024 AST/ALT/eGFR/CRT, TSH  Pertinent imagin/2024 lumbar spine XR: Mild L5-S1 facet arthrosis otherwise normal.    REVIEW OF SYSTEMS:  Except as noted in the history above, relevant review of systems with emphasis on autoimmune rheumatic diseases was otherwise negative.      ACTIVE PROBLEM LIST:  Patient Active Problem List    Diagnosis Date Noted    Chest pain radiating to jaw 2013       PAST MEDICAL HISTORY:  Past Medical History:   Diagnosis Date    Depression    Hypertension  Hyperlipidemia  Anxiety  Nephrolithiasis    PAST SURGICAL HISTORY:  No past surgical history on file.    MEDICATIONS:  Current Outpatient Medications   Medication Sig    cyclobenzaprine (FLEXERIL) 5 mg tablet 1 tablet Orally Twice a day for 30 days  As needed    omeprazole (PRILOSEC) 20 MG delayed-release capsule 1 capsule 30 minutes before meal Oral Once a day for 30 days    citalopram (CELEXA) 20 MG Tab Take 20 mg by mouth every day.    hydrocodone-acetaminophen (NORCO) 5-325 MG Tab per tablet Take 1-2 Tabs by mouth every four hours as needed. (Patient not taking: Reported on 2024)       ALLERGIES:   No Known Allergies    IMMUNIZATIONS:   Immunization History   Administered Date(s) Administered    INFLUENZA TIV (IM) 2013    Tdap Vaccine 2017       SOCIAL HISTORY:   Social History     Socioeconomic History    Marital status:    Tobacco Use    Smoking status: Never    Smokeless tobacco: Never   Substance and Sexual Activity    Alcohol use: Yes     Comment: Occasionally    Drug use: No       FAMILY HISTORY: See HPI         Objective     Vital Signs: /76 (BP Location: Left arm, Patient Position: Sitting, BP Cuff Size: Adult)   Pulse 73   Temp 36.7 °C (98.1 °F) (Temporal)   Ht  "1.651 m (5' 5\")   Wt 81.2 kg (179 lb)   SpO2 97% Body mass index is 29.79 kg/m².    General: Appears well and comfortable  Eyes: No scleral or conjunctival lesions  ENT: No apparent oral or nasal lesions  Head/Neck: No apparent scalp or neck lesions  Cardiovascular: Regular rate and rhythm; no pericardial rubs  Respiratory: Breathing quiet and unlabored; no rales or pleural rubs  Gastrointestinal: No apparent organomegaly or abdominal masses  Integumentary: No significant cutaneous lesions or dyspigmentation  No palpable purpura, no malar rash  Musculoskeletal: No significant joint tenderness, periarticular soft tissue swelling, warmth, erythema, or overt synovitis; no significant restriction in range of motion of joints examined  Neurologic: No focal sensory or motor deficits  Psychiatric: Mood and affect appropriate      LABORATORY RESULTS REVIEWED AND INTERPRETED BY ME:  Lab Results   Component Value Date/Time    PROTHROMBTM 10.7 01/16/2013 12:12 AM    INR 0.97 01/16/2013 12:12 AM     Lab Results   Component Value Date/Time    WBC 6.9 09/09/2016 09:25 PM    RBC 5.01 09/09/2016 09:25 PM    HEMOGLOBIN 16.5 09/09/2016 09:25 PM    HEMATOCRIT 44.4 09/09/2016 09:25 PM    MCV 88.6 09/09/2016 09:25 PM    MCH 32.9 09/09/2016 09:25 PM    MCHC 37.2 (H) 09/09/2016 09:25 PM    RDW 39.9 09/09/2016 09:25 PM    PLATELETCT 290 09/09/2016 09:25 PM    MPV 10.0 09/09/2016 09:25 PM    NEUTS 4.56 09/09/2016 09:25 PM    LYMPHOCYTES 23.10 09/09/2016 09:25 PM    MONOCYTES 8.50 09/09/2016 09:25 PM    EOSINOPHILS 1.90 09/09/2016 09:25 PM    BASOPHILS 0.60 09/09/2016 09:25 PM     Lab Results   Component Value Date/Time    ASTSGOT 22 09/09/2016 09:25 PM    ALTSGPT 22 09/09/2016 09:25 PM    ALKPHOSPHAT 98 09/09/2016 09:25 PM    TBILIRUBIN 0.6 09/09/2016 09:25 PM    TOTPROTEIN 7.2 09/09/2016 09:25 PM    ALBUMIN 4.3 09/09/2016 09:25 PM     Lab Results   Component Value Date/Time    SODIUM 136 09/09/2016 09:25 PM    POTASSIUM 3.5 (L) " 09/09/2016 09:25 PM    CHLORIDE 107 09/09/2016 09:25 PM    CO2 23 09/09/2016 09:25 PM    GLUCOSE 127 (H) 09/09/2016 09:25 PM    BUN 20 09/09/2016 09:25 PM    CREATININE 0.86 09/09/2016 09:25 PM    CALCIUM 9.4 09/09/2016 09:25 PM     RADIOLOGY RESULTS REVIEWED AND INTERPRETED BY ME: See above    All relevant laboratory and imaging results reported on this note were reviewed and interpreted by me.         Assessment & Plan     Jamari Acuna is a 54 y.o. male with history as noted above whose presentation merits the following clinical impressions and recommendations:    1. 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 Notably, the JENNIFER test is highly sensitive and lacks diagnostic specificity as it can be seen in a variety of non-rheumatic conditions, such as acute or chronic bacterial or viral infections (presumably via molecular mimicry), autoimmune thyroid disease (Hashimoto's thyroiditis or Graves' disease), autoimmune hepatobiliary disease, inflammatory bowel disease, and lymphoproliferative disease or malignancy, as well as in over 10% of healthy individuals with no clinical evidence of autoimmune rheumatic disease. In any case, it must be interpreted in the context of the overall clinical picture with close attention to disease-specific manifestations and disease-specific antibody subtypes. That said, the presence of persistently positive JENNIFER, especially in high or rising titers, does confer some risk of JENNIFER-associated disease, so if truly inflammatory symptoms develop and persist, clinical follow-up for re-evaluation would be reasonable. In this case, reasonable to undertake the additional workup noted below for confirmatory, exclusionary, and risk stratification purposes.  -  ANTINUCLEAR AB (JENNIFER), HEP-2, IGG W/RFLX; Future  - COMPLEMENT C4; Future  - COMPLEMENT C3; Future  - CBC WITH DIFFERENTIAL; Future  - Comp Metabolic Panel; Future  - ANTI-SMOOTH MUSCLE ABS; Future  - LIVER-KIDNEY MICROSOMAL AB; Future  - MITOCHONDRIAL (M2) AB; Future    2. Arthralgia of multiple joints  Presentation is compatible with biomechanical arthralgia (history of mild L5-S1 facet arthrosis) rather than inflammatory arthritis. Suspect his intermittent right sided mid-back/flank pain may be related to nephrolithiasis.        The above assessment and plan were discussed with the patient who acknowledged understanding of the plan.    FOLLOW-UP: Return TBD.         Thank you for the opportunity to participate in the care of Jamari Acuna.    Cara Gomes D.O.  Rheumatologist

## 2024-09-18 LAB
ALBUMIN SERPL BCP-MCNC: 4.4 G/DL (ref 3.2–4.9)
ALBUMIN/GLOB SERPL: 1.6 G/DL
ALP SERPL-CCNC: 104 U/L (ref 30–99)
ALT SERPL-CCNC: 20 U/L (ref 2–50)
ANION GAP SERPL CALC-SCNC: 14 MMOL/L (ref 7–16)
AST SERPL-CCNC: 21 U/L (ref 12–45)
BILIRUB SERPL-MCNC: 0.5 MG/DL (ref 0.1–1.5)
BUN SERPL-MCNC: 20 MG/DL (ref 8–22)
C3 SERPL-MCNC: 109.7 MG/DL (ref 87–200)
C4 SERPL-MCNC: 23.9 MG/DL (ref 19–52)
CALCIUM ALBUM COR SERPL-MCNC: 9.1 MG/DL (ref 8.5–10.5)
CALCIUM SERPL-MCNC: 9.4 MG/DL (ref 8.5–10.5)
CHLORIDE SERPL-SCNC: 101 MMOL/L (ref 96–112)
CO2 SERPL-SCNC: 23 MMOL/L (ref 20–33)
CREAT SERPL-MCNC: 0.74 MG/DL (ref 0.5–1.4)
GFR SERPLBLD CREATININE-BSD FMLA CKD-EPI: 108 ML/MIN/1.73 M 2
GLOBULIN SER CALC-MCNC: 2.8 G/DL (ref 1.9–3.5)
GLUCOSE SERPL-MCNC: 115 MG/DL (ref 65–99)
MITOCHONDRIA M2 IGG SER-ACNC: 8.2 UNITS (ref 0–24.9)
POTASSIUM SERPL-SCNC: 4.7 MMOL/L (ref 3.6–5.5)
PROT SERPL-MCNC: 7.2 G/DL (ref 6–8.2)
SMA IGG SER-ACNC: 7 UNITS (ref 0–19)
SODIUM SERPL-SCNC: 138 MMOL/L (ref 135–145)

## 2024-09-19 LAB
ANA PAT SER IF-IMP: ABNORMAL
ANA PAT SER IF-IMP: ABNORMAL
CHROMATIN IGG SERPL-ACNC: 2 UNITS (ref 0–19)
DSDNA AB TITR SER CLIF: NORMAL {TITER}
LKM AB TITR SER IF: NORMAL {TITER}
NUCLEAR IGG SER QL IF: DETECTED
NUCLEAR IGG TITR SER IF: ABNORMAL {TITER}

## 2024-09-20 LAB — U1 SNRNP IGG SER QL: 2 UNITS (ref 0–19)

## 2024-09-22 LAB
ENA SCL70 IGG SER QL: 0 AU/ML (ref 0–40)
ENA SM IGG SER-ACNC: 9 AU/ML (ref 0–40)
ENA SS-A 60KD AB SER-ACNC: 0 AU/ML (ref 0–40)
ENA SS-A IGG SER QL: 25 AU/ML (ref 0–40)
ENA SS-B IGG SER IA-ACNC: 1 AU/ML (ref 0–40)

## 2024-10-08 ENCOUNTER — TELEPHONE (OUTPATIENT)
Dept: RHEUMATOLOGY | Facility: MEDICAL CENTER | Age: 54
End: 2024-10-08
Payer: COMMERCIAL

## 2025-04-24 ENCOUNTER — HOSPITAL ENCOUNTER (OUTPATIENT)
Facility: MEDICAL CENTER | Age: 55
End: 2025-04-24
Attending: UROLOGY
Payer: COMMERCIAL

## 2025-04-24 ENCOUNTER — OFFICE VISIT (OUTPATIENT)
Dept: UROLOGY | Facility: MEDICAL CENTER | Age: 55
End: 2025-04-24
Payer: COMMERCIAL

## 2025-04-24 DIAGNOSIS — R31.0 GROSS HEMATURIA: ICD-10-CM

## 2025-04-24 DIAGNOSIS — R30.9 PAINFUL MICTURITION: ICD-10-CM

## 2025-04-24 DIAGNOSIS — N40.0 BENIGN PROSTATIC HYPERPLASIA, UNSPECIFIED WHETHER LOWER URINARY TRACT SYMPTOMS PRESENT: ICD-10-CM

## 2025-04-24 LAB
APPEARANCE UR: CLEAR
APPEARANCE UR: CLEAR
BILIRUB UR QL STRIP.AUTO: NEGATIVE
BILIRUB UR STRIP-MCNC: NORMAL MG/DL
COLOR UR AUTO: YELLOW
COLOR UR: YELLOW
GLUCOSE UR STRIP.AUTO-MCNC: NEGATIVE MG/DL
GLUCOSE UR STRIP.AUTO-MCNC: NORMAL MG/DL
KETONES UR STRIP.AUTO-MCNC: NEGATIVE MG/DL
KETONES UR STRIP.AUTO-MCNC: NORMAL MG/DL
LEUKOCYTE ESTERASE UR QL STRIP.AUTO: NEGATIVE
LEUKOCYTE ESTERASE UR QL STRIP.AUTO: NORMAL
MICRO URNS: NORMAL
NITRITE UR QL STRIP.AUTO: NEGATIVE
NITRITE UR QL STRIP.AUTO: NORMAL
PH UR STRIP.AUTO: 6 [PH] (ref 5–8)
PH UR STRIP.AUTO: 6 [PH] (ref 5–8)
POC POST-VOID: 21 ML
POC PRE-VOID: NORMAL
PROT UR QL STRIP: NEGATIVE MG/DL
PROT UR QL STRIP: NORMAL MG/DL
RBC UR QL AUTO: NEGATIVE
RBC UR QL AUTO: NORMAL
SP GR UR STRIP.AUTO: 1.02
SP GR UR STRIP.AUTO: 1.02
UROBILINOGEN UR STRIP-MCNC: 0.2 MG/DL
UROBILINOGEN UR STRIP.AUTO-MCNC: 0.2 EU/DL

## 2025-04-24 PROCEDURE — 99203 OFFICE O/P NEW LOW 30 MIN: CPT | Performed by: UROLOGY

## 2025-04-24 PROCEDURE — 51798 US URINE CAPACITY MEASURE: CPT | Performed by: UROLOGY

## 2025-04-24 PROCEDURE — 81002 URINALYSIS NONAUTO W/O SCOPE: CPT | Performed by: UROLOGY

## 2025-04-24 PROCEDURE — 81003 URINALYSIS AUTO W/O SCOPE: CPT

## 2025-04-24 NOTE — PROGRESS NOTES
Chief Complaint: pelvic pain and dysuria; hematuria    HPI: Jamari Acuna is a 54 y.o. male with a history of dyslipidemia and depression, but in good overall health, referred for about three months of pelvic pain and dysuria.     He explains that his only prior urologic history was passage of a kidney stone about 15 years ago. He's had no history of urinary tract infection, nor any  or pelvic injury or trauma.     He now has near constant pain over the lower abdomen, but also pain in the penis, perineum, and down toward the rectum. He has some relief after urinating but the pain quickly recurs.     Recent urinalyses have been negative, though today a POCT urinalysis is positive for blood. We'll send this for microscopic UA. Meanwhile, he says that a few weeks ago he had some episodes of gross hematuria with small clots.      Symptoms:  Frequency: yes  Urgency: no  Nocturia: yes  Stress incontinence: no  Urge incontinence: no  Dysuria: yes  Gross hematuria: yes  Weakened stream: no  Strain to empty: no      Past Medical History:  Past Medical History:   Diagnosis Date    Depression        Past Surgical History:  History reviewed. No pertinent surgical history.    Family History:  History reviewed. No pertinent family history.    Social History:  Social History     Socioeconomic History    Marital status:      Spouse name: Not on file    Number of children: Not on file    Years of education: Not on file    Highest education level: Not on file   Occupational History    Not on file   Tobacco Use    Smoking status: Never    Smokeless tobacco: Never   Substance and Sexual Activity    Alcohol use: Yes     Comment: Occasionally    Drug use: No    Sexual activity: Not on file   Other Topics Concern    Not on file   Social History Narrative    Not on file     Social Drivers of Health     Financial Resource Strain: Not on file   Food Insecurity: Not on file   Transportation Needs: Not on file   Physical  Activity: Not on file   Stress: Not on file   Social Connections: Not on file   Intimate Partner Violence: Not on file   Housing Stability: Not on file       Medications:  Current Outpatient Medications   Medication Sig Dispense Refill    omeprazole (PRILOSEC) 20 MG delayed-release capsule 1 capsule 30 minutes before meal Oral Once a day for 30 days      citalopram (CELEXA) 20 MG Tab Take 20 mg by mouth every day.      hydrocodone-acetaminophen (NORCO) 5-325 MG Tab per tablet Take 1-2 Tabs by mouth every four hours as needed. (Patient not taking: Reported on 4/24/2025) 16 Tab 0     No current facility-administered medications for this visit.       Allergies:  No Known Allergies    Review of Systems:  Constitutional: Negative for fever, chills and malaise/fatigue.   HENT: Negative for congestion.    Eyes: Negative for pain.   Respiratory: Negative for cough and shortness of breath.    Cardiovascular: Negative for leg swelling.   Gastrointestinal: Negative for nausea, vomiting, abdominal pain and diarrhea.   Genitourinary: Negative for dysuria and hematuria.   Skin: Negative for rash.   Neurological: Negative for dizziness, focal weakness and headaches.   Endo/Heme/Allergies: Does not bruise/bleed easily.   Psychiatric/Behavioral: Negative for depression.  The patient is not nervous/anxious.        Physical Exam:  There were no vitals filed for this visit.    GENERAL: well appearing, well nourished, NAD  RESP: respiratory effort normal  ABDOMEN: soft, nontender, nondistended, no masses or organomegaly  HERNIAS: no hernias found on exam  SKIN/LYMPH: normal coloration and turgor, no suspicious skin lesions noted  NEURO/PSYCH: alert, oriented, normal speech, no focal findings or movement disorder noted  EXTREMITIES: no pedal edema noted  GENITOURINARY: normal male external genitalia, penis is normal, testes descended bilaterally, no testicular masses or scrotal swelling, and uncircumcised    PVR: 21 ml    Data  Review:    Labs:  POCT UA   Lab Results   Component Value Date/Time    POCCOLOR yellow 04/24/2025 03:26 PM    POCAPPEAR clear 04/24/2025 03:26 PM    POCLEUKEST neg 04/24/2025 03:26 PM    POCNITRITE neg 04/24/2025 03:26 PM    POCUROBILIGE 0.2 04/24/2025 03:26 PM    POCPROTEIN neg 04/24/2025 03:26 PM    POCURPH 6.0 04/24/2025 03:26 PM    POCBLOOD trace 04/24/2025 03:26 PM    POCSPGRV 1.025 04/24/2025 03:26 PM    POCKETONES neg 04/24/2025 03:26 PM    POCBILIRUBIN neg 04/24/2025 03:26 PM    POCGLUCUA neg 04/24/2025 03:26 PM      CBC   Lab Results   Component Value Date/Time    WBC 7.0 09/17/2024 0923    RBC 5.65 09/17/2024 0923    HEMOGLOBIN 18.0 09/17/2024 0923    HEMATOCRIT 51.8 09/17/2024 0923    MCV 91.7 09/17/2024 0923    MCH 31.9 09/17/2024 0923    MCHC 34.7 09/17/2024 0923    RDW 40.9 09/17/2024 0923    MPV 10.6 09/17/2024 0923    LYMPHOCYTES 20.50 (L) 09/17/2024 0923    LYMPHS 1.44 09/17/2024 0923    MONOCYTES 6.80 09/17/2024 0923    MONOS 0.48 09/17/2024 0923    EOSINOPHILS 1.90 09/17/2024 0923    EOS 0.13 09/17/2024 0923    BASOPHILS 0.60 09/17/2024 0923    BASO 0.04 09/17/2024 0923    NRBC 0.00 09/17/2024 0923       CMP   Lab Results   Component Value Date/Time    SODIUM 138 09/17/2024 0923    POTASSIUM 4.7 09/17/2024 0923    CHLORIDE 101 09/17/2024 0923    CO2 23 09/17/2024 0923    ANION 14.0 09/17/2024 0923    GLUCOSE 115 (H) 09/17/2024 0923    BUN 20 09/17/2024 0923    CREATININE 0.74 09/17/2024 0923    GFRCKD 108 09/17/2024 0923    CALCIUM 9.4 09/17/2024 0923    CORRCALC 9.1 09/17/2024 0923    ASTSGOT 21 09/17/2024 0923    ALTSGPT 20 09/17/2024 0923    ALKPHOSPHAT 104 (H) 09/17/2024 0923    TBILIRUBIN 0.5 09/17/2024 0923    ALBUMIN 4.4 09/17/2024 0923    TOTPROTEIN 7.2 09/17/2024 0923    GLOBULIN 2.8 09/17/2024 0923    AGRATIO 1.6 09/17/2024 0923       Imaging:   None applicable      Assessment: 54 y.o. male with a history of three months of persistent pelvic and genital pain, as well as a few episodes of  gross hematuria with clots. He also has a distant past history of passing kidney stones. He has high risk hematuria given his history of tobacco use (former regular smoker, now smokes on weekends), gross hematuria, age, and sex.     We discussed the differential diagnosis of gross and microscopic hematuria, including urinary tract infection, renal or bladder stones, trauma, rare entities like renal arteriovenous malformations or aneurysms, benign growths of the urinary tract, and malignancy including renal cell carcinoma, upper tract urothelial carcinoma, bladder cancer, and urethral cancer.      Workup should include imaging of the upper urinary tract with and without contrast including delayed images. This is most typically done with a CT urogram, but when this is not possible (ie, iodine contrast allergy, CKD), can also be done with an MR urogram. We also recommend evaluation the lower urinary tract with cystoscopy, as the sensitivity of imaging studies for smaller lesions in the bladder and urethra is not sufficient.         Plan:    -CT urogram to assess for both stones and any upper tract malignancy.   -Cystoscopy      Abel Saenz MD

## 2025-05-05 ENCOUNTER — TELEPHONE (OUTPATIENT)
Dept: UROLOGY | Facility: MEDICAL CENTER | Age: 55
End: 2025-05-05
Payer: COMMERCIAL

## 2025-05-06 ENCOUNTER — APPOINTMENT (OUTPATIENT)
Dept: RADIOLOGY | Facility: MEDICAL CENTER | Age: 55
End: 2025-05-06
Attending: UROLOGY
Payer: COMMERCIAL

## 2025-05-15 ENCOUNTER — TELEPHONE (OUTPATIENT)
Dept: UROLOGY | Facility: MEDICAL CENTER | Age: 55
End: 2025-05-15
Payer: COMMERCIAL

## 2025-05-15 NOTE — TELEPHONE ENCOUNTER
I have been unable to reach Mr. Acuna this week; phone is not connecting. I was able to review his CT imaging from [facility], and this demonstrates a 6-7 mm left UPJ stone with associated mild to moderate hydronephrosis. This may be the cause of his recent gross hematuria, and I wanted to check on his symptoms and discuss management options. He is scheduled for a follow up appointment later this month.

## 2025-05-27 ENCOUNTER — PROCEDURE VISIT (OUTPATIENT)
Dept: UROLOGY | Facility: MEDICAL CENTER | Age: 55
End: 2025-05-27
Payer: COMMERCIAL

## 2025-05-27 DIAGNOSIS — N40.0 BENIGN PROSTATIC HYPERPLASIA, UNSPECIFIED WHETHER LOWER URINARY TRACT SYMPTOMS PRESENT: ICD-10-CM

## 2025-05-27 DIAGNOSIS — N20.0 LEFT RENAL STONE: Primary | ICD-10-CM

## 2025-05-27 LAB
APPEARANCE UR: CLEAR
BILIRUB UR STRIP-MCNC: NORMAL MG/DL
COLOR UR AUTO: YELLOW
GLUCOSE UR STRIP.AUTO-MCNC: NORMAL MG/DL
KETONES UR STRIP.AUTO-MCNC: NORMAL MG/DL
LEUKOCYTE ESTERASE UR QL STRIP.AUTO: NORMAL
NITRITE UR QL STRIP.AUTO: NORMAL
PH UR STRIP.AUTO: 7.5 [PH] (ref 5–8)
PROT UR QL STRIP: NORMAL MG/DL
RBC UR QL AUTO: NORMAL
SP GR UR STRIP.AUTO: 1.01
UROBILINOGEN UR STRIP-MCNC: 0.2 MG/DL

## 2025-05-27 PROCEDURE — 99212 OFFICE O/P EST SF 10 MIN: CPT | Mod: 57 | Performed by: UROLOGY

## 2025-05-27 PROCEDURE — 81002 URINALYSIS NONAUTO W/O SCOPE: CPT | Performed by: UROLOGY

## 2025-05-27 NOTE — PROGRESS NOTES
Chief Complaint: hematuria and pelvic pain    HPI: Jamari Acuna is a 54 y.o. male with a history of pelvic pain and hematuria, here for follow up. He had episodes of gross hematuria and we scheduled him for cystoscopy today, but since our first visit he had outside imaging demonstrating a 6 mm left UPJ stone with hydronephrosis, and some smaller non-obstructing stones.     Today he reports ongoing intermittent left flank and pelvic pain, as well as associated nausea. He's had no fevers, chills, dysuria, cloudy urine, or foul-smelling urine.     Past Medical History:  Past Medical History[1]    Past Surgical History:  Past Surgical History[2]    Family History:  No family history on file.    Social History:  Social History     Socioeconomic History    Marital status:      Spouse name: Not on file    Number of children: Not on file    Years of education: Not on file    Highest education level: Not on file   Occupational History    Not on file   Tobacco Use    Smoking status: Never    Smokeless tobacco: Never   Substance and Sexual Activity    Alcohol use: Yes     Comment: Occasionally    Drug use: No    Sexual activity: Not on file   Other Topics Concern    Not on file   Social History Narrative    Not on file     Social Drivers of Health     Financial Resource Strain: Not on file   Food Insecurity: Not on file   Transportation Needs: Not on file   Physical Activity: Not on file   Stress: Not on file   Social Connections: Not on file   Intimate Partner Violence: Not on file   Housing Stability: Not on file       Medications:  Current Medications[3]    Allergies:  Allergies[4]    Review of Systems:  Constitutional: Negative for fever, chills and malaise/fatigue.   HENT: Negative for congestion.    Eyes: Negative for pain.   Respiratory: Negative for cough and shortness of breath.    Cardiovascular: Negative for leg swelling.   Gastrointestinal: Negative for nausea, vomiting, abdominal pain and  diarrhea.   Genitourinary: Negative for dysuria and hematuria.   Skin: Negative for rash.   Neurological: Negative for dizziness, focal weakness and headaches.   Endo/Heme/Allergies: Does not bruise/bleed easily.   Psychiatric/Behavioral: Negative for depression.  The patient is not nervous/anxious.        Physical Exam:  There were no vitals filed for this visit.    GENERAL: well appearing, well nourished, NAD  RESP: respiratory effort normal  ABDOMEN: soft, nontender, nondistended, no masses or organomegaly  HERNIAS: no hernias found on exam  SKIN/LYMPH: normal coloration and turgor, no suspicious skin lesions noted  NEURO/PSYCH: alert, oriented, normal speech, no focal findings or movement disorder noted  EXTREMITIES: no pedal edema noted  GENITOURINARY: normal male external genitalia    Data Review:    Labs:  POCT UA   Lab Results   Component Value Date/Time    POCCOLOR yellow 05/27/2025 01:45 PM    POCAPPEAR clear 05/27/2025 01:45 PM    POCLEUKEST neg 05/27/2025 01:45 PM    POCNITRITE neg 05/27/2025 01:45 PM    POCUROBILIGE 0.2 05/27/2025 01:45 PM    POCPROTEIN neg 05/27/2025 01:45 PM    POCURPH 7.5 05/27/2025 01:45 PM    POCBLOOD neg 05/27/2025 01:45 PM    POCSPGRV 1.015 05/27/2025 01:45 PM    POCKETONES neg 05/27/2025 01:45 PM    POCBILIRUBIN neg 05/27/2025 01:45 PM    POCGLUCUA neg 05/27/2025 01:45 PM      CBC   Lab Results   Component Value Date/Time    WBC 7.0 09/17/2024 0923    RBC 5.65 09/17/2024 0923    HEMOGLOBIN 18.0 09/17/2024 0923    HEMATOCRIT 51.8 09/17/2024 0923    MCV 91.7 09/17/2024 0923    MCH 31.9 09/17/2024 0923    MCHC 34.7 09/17/2024 0923    RDW 40.9 09/17/2024 0923    MPV 10.6 09/17/2024 0923    LYMPHOCYTES 20.50 (L) 09/17/2024 0923    LYMPHS 1.44 09/17/2024 0923    MONOCYTES 6.80 09/17/2024 0923    MONOS 0.48 09/17/2024 0923    EOSINOPHILS 1.90 09/17/2024 0923    EOS 0.13 09/17/2024 0923    BASOPHILS 0.60 09/17/2024 0923    BASO 0.04 09/17/2024 0923    NRBC 0.00 09/17/2024 0923       CMP    Lab Results   Component Value Date/Time    SODIUM 138 09/17/2024 0923    POTASSIUM 4.7 09/17/2024 0923    CHLORIDE 101 09/17/2024 0923    CO2 23 09/17/2024 0923    ANION 14.0 09/17/2024 0923    GLUCOSE 115 (H) 09/17/2024 0923    BUN 20 09/17/2024 0923    CREATININE 0.74 09/17/2024 0923    GFRCKD 108 09/17/2024 0923    CALCIUM 9.4 09/17/2024 0923    CORRCALC 9.1 09/17/2024 0923    ASTSGOT 21 09/17/2024 0923    ALTSGPT 20 09/17/2024 0923    ALKPHOSPHAT 104 (H) 09/17/2024 0923    TBILIRUBIN 0.5 09/17/2024 0923    ALBUMIN 4.4 09/17/2024 0923    TOTPROTEIN 7.2 09/17/2024 0923    GLOBULIN 2.8 09/17/2024 0923    AGRATIO 1.6 09/17/2024 0923       Imaging:   Outside CTAP (4/25/25): 6x4 mm left UPJ stone with smaller non-obstructive stones    Assessment: 54 y.o.male with a history of gross hematuria and episodes of pain likely secondary to an obstructing left UPJ stone (6 x 4 mm). He has had ongoing episodes of pain for over a month and is interested in definitive treatment. We discussed the approach to ureteroscopy and laser lithotripsy with stent insertion, including the risks of surgery and expected recovery, and he would like to proceed as soon as possible.       Plan:    -Schedule left ureteroscopy, laser lithotripsy, and stent insertion as soon as possible    Abel Saenz MD       [1]   Past Medical History:  Diagnosis Date    Depression    [2] No past surgical history on file.  [3]   Current Outpatient Medications   Medication Sig Dispense Refill    omeprazole (PRILOSEC) 20 MG delayed-release capsule 1 capsule 30 minutes before meal Oral Once a day for 30 days      citalopram (CELEXA) 20 MG Tab Take 20 mg by mouth every day.      hydrocodone-acetaminophen (NORCO) 5-325 MG Tab per tablet Take 1-2 Tabs by mouth every four hours as needed. (Patient not taking: Reported on 4/24/2025) 16 Tab 0     No current facility-administered medications for this visit.   [4] No Known Allergies

## 2025-05-28 ENCOUNTER — APPOINTMENT (OUTPATIENT)
Dept: ADMISSIONS | Facility: MEDICAL CENTER | Age: 55
End: 2025-05-28
Attending: UROLOGY
Payer: COMMERCIAL

## 2025-05-30 ENCOUNTER — PRE-ADMISSION TESTING (OUTPATIENT)
Dept: ADMISSIONS | Facility: MEDICAL CENTER | Age: 55
End: 2025-05-30
Attending: UROLOGY
Payer: COMMERCIAL

## 2025-05-30 VITALS — HEIGHT: 65 IN | BODY MASS INDEX: 29.79 KG/M2

## 2025-05-30 DIAGNOSIS — Z01.812 PRE-OPERATIVE LABORATORY EXAMINATION: Primary | ICD-10-CM

## 2025-05-30 RX ORDER — CITALOPRAM HYDROBROMIDE 10 MG/1
10 TABLET ORAL
COMMUNITY

## 2025-05-30 RX ORDER — HYDROCODONE BITARTRATE AND ACETAMINOPHEN 5; 325 MG/1; MG/1
1 TABLET ORAL EVERY 4 HOURS PRN
Status: ON HOLD | COMMUNITY
End: 2025-06-04

## 2025-05-30 RX ORDER — TAMSULOSIN HYDROCHLORIDE 0.4 MG/1
CAPSULE ORAL
Status: ON HOLD | COMMUNITY
Start: 2024-02-09 | End: 2025-06-04

## 2025-05-30 NOTE — PREADMIT AVS NOTE
Current Medications   Medication Instructions    citalopram (CELEXA) 10 MG tablet Continúe tomando mihaela medicamento según lo recetado. (Continue taking this medication as prescribed.)    HYDROcodone-acetaminophen (NORCO) 5-325 MG Tab per tablet [place] seguir tomando mihaela medicamento según lo recetado. (It is ok to continue this medication as prescribed.)     tamsulosin (FLOMAX) 0.4 MG capsule Not taking    omeprazole (PRILOSEC) 20 MG delayed-release capsule Not taking

## 2025-05-30 NOTE — PREPROCEDURE INSTRUCTIONS
Pre-admit telephone appointment completed.   Pt is working now. He will go to lab tomorrow for pre op testing.

## 2025-05-31 ENCOUNTER — HOSPITAL ENCOUNTER (OUTPATIENT)
Dept: LAB | Facility: MEDICAL CENTER | Age: 55
End: 2025-05-31
Attending: UROLOGY
Payer: COMMERCIAL

## 2025-05-31 DIAGNOSIS — Z01.812 PRE-OPERATIVE LABORATORY EXAMINATION: ICD-10-CM

## 2025-05-31 LAB
ALBUMIN SERPL BCP-MCNC: 4.3 G/DL (ref 3.2–4.9)
ALBUMIN/GLOB SERPL: 1.5 G/DL
ALP SERPL-CCNC: 91 U/L (ref 30–99)
ALT SERPL-CCNC: 23 U/L (ref 2–50)
ANION GAP SERPL CALC-SCNC: 6 MMOL/L (ref 7–16)
APPEARANCE UR: CLEAR
AST SERPL-CCNC: 24 U/L (ref 12–45)
BACTERIA #/AREA URNS HPF: ABNORMAL /HPF
BILIRUB SERPL-MCNC: 0.7 MG/DL (ref 0.1–1.5)
BILIRUB UR QL STRIP.AUTO: NEGATIVE
BUN SERPL-MCNC: 16 MG/DL (ref 8–22)
CALCIUM ALBUM COR SERPL-MCNC: 9.3 MG/DL (ref 8.5–10.5)
CALCIUM SERPL-MCNC: 9.5 MG/DL (ref 8.5–10.5)
CASTS URNS QL MICRO: ABNORMAL /LPF (ref 0–2)
CHLORIDE SERPL-SCNC: 103 MMOL/L (ref 96–112)
CO2 SERPL-SCNC: 28 MMOL/L (ref 20–33)
COLOR UR: YELLOW
CREAT SERPL-MCNC: 0.85 MG/DL (ref 0.5–1.4)
EPITHELIAL CELLS 1715: ABNORMAL /HPF (ref 0–5)
ERYTHROCYTE [DISTWIDTH] IN BLOOD BY AUTOMATED COUNT: 39.2 FL (ref 35.9–50)
GFR SERPLBLD CREATININE-BSD FMLA CKD-EPI: 103 ML/MIN/1.73 M 2
GLOBULIN SER CALC-MCNC: 2.9 G/DL (ref 1.9–3.5)
GLUCOSE SERPL-MCNC: 105 MG/DL (ref 65–99)
GLUCOSE UR STRIP.AUTO-MCNC: NEGATIVE MG/DL
HCT VFR BLD AUTO: 48.9 % (ref 42–52)
HGB BLD-MCNC: 17 G/DL (ref 14–18)
KETONES UR STRIP.AUTO-MCNC: NEGATIVE MG/DL
LEUKOCYTE ESTERASE UR QL STRIP.AUTO: ABNORMAL
MCH RBC QN AUTO: 31.8 PG (ref 27–33)
MCHC RBC AUTO-ENTMCNC: 34.8 G/DL (ref 32.3–36.5)
MCV RBC AUTO: 91.4 FL (ref 81.4–97.8)
MICRO URNS: ABNORMAL
NITRITE UR QL STRIP.AUTO: NEGATIVE
PH UR STRIP.AUTO: 6.5 [PH] (ref 5–8)
PLATELET # BLD AUTO: 287 K/UL (ref 164–446)
PMV BLD AUTO: 10.3 FL (ref 9–12.9)
POTASSIUM SERPL-SCNC: 4.1 MMOL/L (ref 3.6–5.5)
PROT SERPL-MCNC: 7.2 G/DL (ref 6–8.2)
PROT UR QL STRIP: NEGATIVE MG/DL
RBC # BLD AUTO: 5.35 M/UL (ref 4.7–6.1)
RBC # URNS HPF: ABNORMAL /HPF (ref 0–2)
RBC UR QL AUTO: NEGATIVE
SODIUM SERPL-SCNC: 137 MMOL/L (ref 135–145)
SP GR UR STRIP.AUTO: 1.02
UROBILINOGEN UR STRIP.AUTO-MCNC: 0.2 EU/DL
WBC # BLD AUTO: 6.2 K/UL (ref 4.8–10.8)
WBC #/AREA URNS HPF: ABNORMAL /HPF

## 2025-05-31 PROCEDURE — 36415 COLL VENOUS BLD VENIPUNCTURE: CPT

## 2025-05-31 PROCEDURE — 81001 URINALYSIS AUTO W/SCOPE: CPT

## 2025-05-31 PROCEDURE — 85027 COMPLETE CBC AUTOMATED: CPT

## 2025-05-31 PROCEDURE — 87086 URINE CULTURE/COLONY COUNT: CPT

## 2025-05-31 PROCEDURE — 80053 COMPREHEN METABOLIC PANEL: CPT

## 2025-06-02 LAB
BACTERIA UR CULT: NORMAL
SIGNIFICANT IND 70042: NORMAL
SITE SITE: NORMAL
SOURCE SOURCE: NORMAL

## 2025-06-03 ENCOUNTER — ANESTHESIA EVENT (OUTPATIENT)
Dept: SURGERY | Facility: MEDICAL CENTER | Age: 55
End: 2025-06-03
Payer: COMMERCIAL

## 2025-06-03 ENCOUNTER — TELEPHONE (OUTPATIENT)
Dept: UROLOGY | Facility: MEDICAL CENTER | Age: 55
End: 2025-06-03
Payer: COMMERCIAL

## 2025-06-04 ENCOUNTER — HOSPITAL ENCOUNTER (OUTPATIENT)
Facility: MEDICAL CENTER | Age: 55
End: 2025-06-04
Attending: UROLOGY | Admitting: UROLOGY
Payer: COMMERCIAL

## 2025-06-04 ENCOUNTER — ANESTHESIA (OUTPATIENT)
Dept: SURGERY | Facility: MEDICAL CENTER | Age: 55
End: 2025-06-04
Payer: COMMERCIAL

## 2025-06-04 ENCOUNTER — APPOINTMENT (OUTPATIENT)
Dept: RADIOLOGY | Facility: MEDICAL CENTER | Age: 55
End: 2025-06-04
Attending: UROLOGY
Payer: COMMERCIAL

## 2025-06-04 VITALS
DIASTOLIC BLOOD PRESSURE: 86 MMHG | SYSTOLIC BLOOD PRESSURE: 135 MMHG | HEART RATE: 71 BPM | OXYGEN SATURATION: 97 % | WEIGHT: 171.96 LBS | RESPIRATION RATE: 14 BRPM | HEIGHT: 65 IN | BODY MASS INDEX: 28.65 KG/M2 | TEMPERATURE: 97.2 F

## 2025-06-04 DIAGNOSIS — N20.0 LEFT RENAL STONE: Primary | ICD-10-CM

## 2025-06-04 LAB — PATHOLOGY CONSULT NOTE: NORMAL

## 2025-06-04 PROCEDURE — 160002 HCHG RECOVERY MINUTES (STAT): Performed by: UROLOGY

## 2025-06-04 PROCEDURE — 700117 HCHG RX CONTRAST REV CODE 255: Performed by: UROLOGY

## 2025-06-04 PROCEDURE — 82365 CALCULUS SPECTROSCOPY: CPT

## 2025-06-04 PROCEDURE — 52356 CYSTO/URETERO W/LITHOTRIPSY: CPT | Mod: LT | Performed by: UROLOGY

## 2025-06-04 PROCEDURE — 110371 HCHG SHELL REV 272: Performed by: UROLOGY

## 2025-06-04 PROCEDURE — 700101 HCHG RX REV CODE 250: Performed by: ANESTHESIOLOGY

## 2025-06-04 PROCEDURE — 160039 HCHG SURGERY MINUTES - EA ADDL 1 MIN LEVEL 3: Performed by: UROLOGY

## 2025-06-04 PROCEDURE — C1758 CATHETER, URETERAL: HCPCS | Performed by: UROLOGY

## 2025-06-04 PROCEDURE — C1769 GUIDE WIRE: HCPCS | Performed by: UROLOGY

## 2025-06-04 PROCEDURE — 160025 RECOVERY II MINUTES (STATS): Performed by: UROLOGY

## 2025-06-04 PROCEDURE — 160028 HCHG SURGERY MINUTES - 1ST 30 MINS LEVEL 3: Performed by: UROLOGY

## 2025-06-04 PROCEDURE — C2617 STENT, NON-COR, TEM W/O DEL: HCPCS | Performed by: UROLOGY

## 2025-06-04 PROCEDURE — 74018 RADEX ABDOMEN 1 VIEW: CPT

## 2025-06-04 PROCEDURE — 160048 HCHG OR STATISTICAL LEVEL 1-5: Performed by: UROLOGY

## 2025-06-04 PROCEDURE — 700111 HCHG RX REV CODE 636 W/ 250 OVERRIDE (IP): Mod: JZ | Performed by: ANESTHESIOLOGY

## 2025-06-04 PROCEDURE — 160009 HCHG ANES TIME/MIN: Performed by: UROLOGY

## 2025-06-04 PROCEDURE — A9270 NON-COVERED ITEM OR SERVICE: HCPCS | Performed by: ANESTHESIOLOGY

## 2025-06-04 PROCEDURE — 160046 HCHG PACU - 1ST 60 MINS PHASE II: Performed by: UROLOGY

## 2025-06-04 PROCEDURE — 700105 HCHG RX REV CODE 258: Performed by: UROLOGY

## 2025-06-04 PROCEDURE — C1747 HCHG SHELL REV 278 C1747: HCPCS | Performed by: UROLOGY

## 2025-06-04 PROCEDURE — 88300 SURGICAL PATH GROSS: CPT | Performed by: PATHOLOGY

## 2025-06-04 PROCEDURE — 160035 HCHG PACU - 1ST 60 MINS PHASE I: Performed by: UROLOGY

## 2025-06-04 PROCEDURE — 160015 HCHG STAT PREOP MINUTES: Performed by: UROLOGY

## 2025-06-04 PROCEDURE — 88300 SURGICAL PATH GROSS: CPT | Mod: 26 | Performed by: PATHOLOGY

## 2025-06-04 PROCEDURE — 700102 HCHG RX REV CODE 250 W/ 637 OVERRIDE(OP): Performed by: ANESTHESIOLOGY

## 2025-06-04 DEVICE — STENT UROLOGICAL POLARIS 6X26 ULTRA: Type: IMPLANTABLE DEVICE | Site: URETER | Status: FUNCTIONAL

## 2025-06-04 RX ORDER — HYDROMORPHONE HYDROCHLORIDE 1 MG/ML
0.1 INJECTION, SOLUTION INTRAMUSCULAR; INTRAVENOUS; SUBCUTANEOUS
Status: DISCONTINUED | OUTPATIENT
Start: 2025-06-04 | End: 2025-06-04 | Stop reason: HOSPADM

## 2025-06-04 RX ORDER — EPHEDRINE SULFATE 50 MG/ML
INJECTION, SOLUTION INTRAVENOUS PRN
Status: DISCONTINUED | OUTPATIENT
Start: 2025-06-04 | End: 2025-06-04 | Stop reason: SURG

## 2025-06-04 RX ORDER — ROCURONIUM BROMIDE 10 MG/ML
INJECTION, SOLUTION INTRAVENOUS PRN
Status: DISCONTINUED | OUTPATIENT
Start: 2025-06-04 | End: 2025-06-04 | Stop reason: SURG

## 2025-06-04 RX ORDER — LIDOCAINE HYDROCHLORIDE 20 MG/ML
INJECTION, SOLUTION EPIDURAL; INFILTRATION; INTRACAUDAL; PERINEURAL PRN
Status: DISCONTINUED | OUTPATIENT
Start: 2025-06-04 | End: 2025-06-04 | Stop reason: SURG

## 2025-06-04 RX ORDER — CEFAZOLIN SODIUM 1 G/3ML
INJECTION, POWDER, FOR SOLUTION INTRAMUSCULAR; INTRAVENOUS PRN
Status: DISCONTINUED | OUTPATIENT
Start: 2025-06-04 | End: 2025-06-04 | Stop reason: SURG

## 2025-06-04 RX ORDER — OXYCODONE HCL 5 MG/5 ML
5 SOLUTION, ORAL ORAL
Status: DISCONTINUED | OUTPATIENT
Start: 2025-06-04 | End: 2025-06-04 | Stop reason: HOSPADM

## 2025-06-04 RX ORDER — ONDANSETRON 2 MG/ML
4 INJECTION INTRAMUSCULAR; INTRAVENOUS
Status: DISCONTINUED | OUTPATIENT
Start: 2025-06-04 | End: 2025-06-04 | Stop reason: HOSPADM

## 2025-06-04 RX ORDER — DIPHENHYDRAMINE HYDROCHLORIDE 50 MG/ML
12.5 INJECTION, SOLUTION INTRAMUSCULAR; INTRAVENOUS
Status: DISCONTINUED | OUTPATIENT
Start: 2025-06-04 | End: 2025-06-04 | Stop reason: HOSPADM

## 2025-06-04 RX ORDER — PHENAZOPYRIDINE HYDROCHLORIDE 200 MG/1
200 TABLET, FILM COATED ORAL 3 TIMES DAILY PRN
Qty: 10 TABLET | Refills: 0 | Status: SHIPPED | OUTPATIENT
Start: 2025-06-04

## 2025-06-04 RX ORDER — ONDANSETRON 2 MG/ML
INJECTION INTRAMUSCULAR; INTRAVENOUS PRN
Status: DISCONTINUED | OUTPATIENT
Start: 2025-06-04 | End: 2025-06-04 | Stop reason: SURG

## 2025-06-04 RX ORDER — SODIUM CHLORIDE, SODIUM LACTATE, POTASSIUM CHLORIDE, CALCIUM CHLORIDE 600; 310; 30; 20 MG/100ML; MG/100ML; MG/100ML; MG/100ML
INJECTION, SOLUTION INTRAVENOUS CONTINUOUS
Status: DISCONTINUED | OUTPATIENT
Start: 2025-06-04 | End: 2025-06-04 | Stop reason: HOSPADM

## 2025-06-04 RX ORDER — EPHEDRINE SULFATE 50 MG/ML
5 INJECTION, SOLUTION INTRAVENOUS
Status: DISCONTINUED | OUTPATIENT
Start: 2025-06-04 | End: 2025-06-04 | Stop reason: HOSPADM

## 2025-06-04 RX ORDER — SULFAMETHOXAZOLE AND TRIMETHOPRIM 800; 160 MG/1; MG/1
1 TABLET ORAL 2 TIMES DAILY
Qty: 2 TABLET | Refills: 0 | Status: SHIPPED | OUTPATIENT
Start: 2025-06-04 | End: 2025-06-05

## 2025-06-04 RX ORDER — HYDROMORPHONE HYDROCHLORIDE 1 MG/ML
0.4 INJECTION, SOLUTION INTRAMUSCULAR; INTRAVENOUS; SUBCUTANEOUS
Status: DISCONTINUED | OUTPATIENT
Start: 2025-06-04 | End: 2025-06-04 | Stop reason: HOSPADM

## 2025-06-04 RX ORDER — OXYBUTYNIN CHLORIDE 5 MG/1
5 TABLET ORAL 3 TIMES DAILY PRN
Qty: 20 TABLET | Refills: 0 | Status: SHIPPED | OUTPATIENT
Start: 2025-06-04

## 2025-06-04 RX ORDER — MIDAZOLAM HYDROCHLORIDE 1 MG/ML
INJECTION INTRAMUSCULAR; INTRAVENOUS PRN
Status: DISCONTINUED | OUTPATIENT
Start: 2025-06-04 | End: 2025-06-04 | Stop reason: SURG

## 2025-06-04 RX ORDER — OXYCODONE HCL 5 MG/5 ML
10 SOLUTION, ORAL ORAL
Status: DISCONTINUED | OUTPATIENT
Start: 2025-06-04 | End: 2025-06-04 | Stop reason: HOSPADM

## 2025-06-04 RX ORDER — DEXAMETHASONE SODIUM PHOSPHATE 4 MG/ML
INJECTION, SOLUTION INTRA-ARTICULAR; INTRALESIONAL; INTRAMUSCULAR; INTRAVENOUS; SOFT TISSUE PRN
Status: DISCONTINUED | OUTPATIENT
Start: 2025-06-04 | End: 2025-06-04 | Stop reason: SURG

## 2025-06-04 RX ORDER — ALBUTEROL SULFATE 5 MG/ML
2.5 SOLUTION RESPIRATORY (INHALATION)
Status: DISCONTINUED | OUTPATIENT
Start: 2025-06-04 | End: 2025-06-04 | Stop reason: HOSPADM

## 2025-06-04 RX ORDER — KETOROLAC TROMETHAMINE 15 MG/ML
INJECTION, SOLUTION INTRAMUSCULAR; INTRAVENOUS PRN
Status: DISCONTINUED | OUTPATIENT
Start: 2025-06-04 | End: 2025-06-04 | Stop reason: SURG

## 2025-06-04 RX ORDER — ACETAMINOPHEN 500 MG
1000 TABLET ORAL ONCE
Status: COMPLETED | OUTPATIENT
Start: 2025-06-04 | End: 2025-06-04

## 2025-06-04 RX ORDER — HYDROMORPHONE HYDROCHLORIDE 1 MG/ML
0.2 INJECTION, SOLUTION INTRAMUSCULAR; INTRAVENOUS; SUBCUTANEOUS
Status: DISCONTINUED | OUTPATIENT
Start: 2025-06-04 | End: 2025-06-04 | Stop reason: HOSPADM

## 2025-06-04 RX ADMIN — ACETAMINOPHEN 1000 MG: 500 TABLET ORAL at 06:51

## 2025-06-04 RX ADMIN — CEFAZOLIN 2 G: 1 INJECTION, POWDER, FOR SOLUTION INTRAMUSCULAR; INTRAVENOUS at 07:32

## 2025-06-04 RX ADMIN — ONDANSETRON 4 MG: 2 INJECTION INTRAMUSCULAR; INTRAVENOUS at 07:40

## 2025-06-04 RX ADMIN — DEXAMETHASONE SODIUM PHOSPHATE 8 MG: 4 INJECTION INTRA-ARTICULAR; INTRALESIONAL; INTRAMUSCULAR; INTRAVENOUS; SOFT TISSUE at 07:40

## 2025-06-04 RX ADMIN — SODIUM CHLORIDE, POTASSIUM CHLORIDE, SODIUM LACTATE AND CALCIUM CHLORIDE: 600; 310; 30; 20 INJECTION, SOLUTION INTRAVENOUS at 06:51

## 2025-06-04 RX ADMIN — SUGAMMADEX 200 MG: 100 INJECTION, SOLUTION INTRAVENOUS at 08:23

## 2025-06-04 RX ADMIN — ROCURONIUM BROMIDE 10 MG: 10 INJECTION INTRAVENOUS at 08:07

## 2025-06-04 RX ADMIN — FENTANYL CITRATE 100 MCG: 50 INJECTION, SOLUTION INTRAMUSCULAR; INTRAVENOUS at 07:32

## 2025-06-04 RX ADMIN — ROCURONIUM BROMIDE 20 MG: 10 INJECTION INTRAVENOUS at 07:40

## 2025-06-04 RX ADMIN — MIDAZOLAM HYDROCHLORIDE 2 MG: 1 INJECTION, SOLUTION INTRAMUSCULAR; INTRAVENOUS at 07:28

## 2025-06-04 RX ADMIN — LIDOCAINE HYDROCHLORIDE 100 MG: 20 INJECTION, SOLUTION EPIDURAL; INFILTRATION; INTRACAUDAL; PERINEURAL at 07:32

## 2025-06-04 RX ADMIN — KETOROLAC TROMETHAMINE 15 MG: 15 INJECTION, SOLUTION INTRAMUSCULAR; INTRAVENOUS at 08:23

## 2025-06-04 RX ADMIN — PROPOFOL 200 MG: 10 INJECTION, EMULSION INTRAVENOUS at 07:32

## 2025-06-04 RX ADMIN — EPHEDRINE SULFATE 10 MG: 50 INJECTION, SOLUTION INTRAVENOUS at 07:41

## 2025-06-04 ASSESSMENT — PAIN DESCRIPTION - PAIN TYPE
TYPE: SURGICAL PAIN

## 2025-06-04 ASSESSMENT — FIBROSIS 4 INDEX: FIB4 SCORE: 0.92

## 2025-06-04 ASSESSMENT — PAIN SCALES - GENERAL: PAIN_LEVEL: 0

## 2025-06-04 NOTE — ANESTHESIA PREPROCEDURE EVALUATION
Case: [id number] Date/Time: 06/04/25 0715    Procedures:       CYSTOSCOPY AND LEFT URETEROSCOPY WITH LASER LITHOTRIPSY AND STENT INSERTION      URETEROSCOPY      LITHOTRIPSY, USING LASER    Pre-op diagnosis: LEFT RENAL STONE    Location: [facility] OR 03 / SURGERY [facility]    Surgeons: Abel Saenz M.D.          53 y/o male presenting for operative treatment of Left ureteral stone.    Relevant Problems      (positive) Kidney stones      Other   (positive) Depression   (+) Tobacco  (+) Occ. ETOH    Physical Exam    Airway   Mallampati: III  TM distance: >3 FB  Neck ROM: full       Cardiovascular - normal exam  Rhythm: regular  Rate: normal    (-) murmur     Dental - normal exam           Pulmonary - normal examBreath sounds clear to auscultation     Abdominal    Neurological - normal exam                   Anesthesia Plan    ASA 2       Plan - general       Airway plan will be LMA        Plan Factors:   Patient was previously instructed to abstain from smoking on day of procedure.  Patient did not smoke on day of procedure.      Induction: intravenous    Postoperative Plan: Postoperative administration of opioids is intended.    Pertinent diagnostic labs and testing reviewed    Informed Consent:    Anesthetic plan and risks discussed with patient.    Use of blood products discussed with: patient whom consented to blood products.

## 2025-06-04 NOTE — DISCHARGE INSTR - OTHER INFO
"-Stay very well hydrated for the next week to help pass the \"dust\" from the prior stone.  -You can take the as-needed prescriptions for phenazopyridine and oxybutynin for any bladder pain related to your stent.  -If you do take phenazopyridine, it will turn your urine an orange-red color; this is normal and not concerning.  -You can remove your stent on Monday June 9th.  a shower or bathtub, release the bandages holding the string, and slowly but steadily pull the string while exhaling. This will remove the entire stent, which you can discard.  -Take the prescribed antibiotic on the day of stent removal (both morning and evening). Be sure to wait about an hour after taking the antibiotic before pulling your stent.   -If after stent removal you have any significant flank pain with fevers or chills, please call the office at [phone], or go to the emergency room if after hours.  -Call our office ([phone]) or send a Pixim message to Dr. Saenz with any questions or concerns prior to your follow up appointment.     Google Translation:  Manténgase mariposa hidratado mary la próxima semana para ayudar a expulsar el polvo del cálculo anterior.    Puede abram las recetas de fenazopiridina y oxibutinina según sea necesario para cualquier dolor de vejiga relacionado con el stent.    Si oscar fenazopiridina, holt orina adquirirá un color chaney anaranjado; esto es normal y no preocupante.    Puede retirarse el stent el lunes 9 de junio. Póngase de pie en la ducha o bañera, suelte las vendas que sujetan el hilo y tire del hilo lenta deuce firmemente mientras exhala. Sellers retirará todo el stent, que puede desechar.    [place] el antibiótico recetado el día de la retirada del stent (tanto por la mañana roni por la noche). Asegúrese de esperar aproximadamente janiya hora después de abram el antibiótico antes de retirarse el stent. Si después de la extracción del stent presenta dolor intenso en el flanco con fiebre o " escalofríos, llame a la oficina al [phone] o acuda a urgencias fuera del horario de atención.    Llame a nuestra oficina ([phone]) o envíe un mensaje de Lidia al Dr. Sidhuuser si tiene alguna pregunta o inquietud antes de holt erika de seguimiento.

## 2025-06-04 NOTE — OR NURSING
0917:Pt arrived to stage 2 via gurney and getting dressed self. He was able to void    0920:Family at bedside    1000:Patient education completed, family denies further questions.DC'd to care of family post uneventful stay in PACU 2. IV discontinued.     1007:Pt taken out via wc and placed into care of family

## 2025-06-04 NOTE — DISCHARGE INSTRUCTIONS
ACTIVITY: Rest and take it easy for the first 24 hours.  A responsible adult is recommended to remain with you during that time.  It is normal to feel sleepy.  We encourage you to not do anything that requires balance, judgment or coordination.    MILD FLU-LIKE SYMPTOMS ARE NORMAL. YOU MAY EXPERIENCE GENERALIZED MUSCLE ACHES, THROAT IRRITATION, HEADACHE AND/OR SOME NAUSEA.    FOR 24 HOURS DO NOT:  Drive, operate machinery or run household appliances.  Drink beer or alcoholic beverages.   Make important decisions or sign legal documents.    DIET: To avoid nausea, slowly advance diet as tolerated, avoiding spicy or greasy foods for the first day.  Add more substantial food to your diet according to your physician's instructions.  Babies can be fed formula or breast milk as soon as they are hungry.  INCREASE FLUIDS AND FIBER TO AVOID CONSTIPATION.    FOLLOW-UP APPOINTMENT:  A follow-up appointment should be arranged with your doctor call to schedule.    You should CALL YOUR PHYSICIAN if you develop:  Fever greater than 101 degrees F.  Pain not relieved by medication, or persistent nausea or vomiting.  Excessive bleeding (blood soaking through dressing) or unexpected drainage from the wound.  Extreme redness or swelling around the incision site, drainage of pus or foul smelling drainage.  Inability to urinate or empty your bladder within 8 hours.  Problems with breathing or chest pain.    You should call 911 if you develop problems with breathing or chest pain.  If you are unable to contact your doctor or surgical center, you should go to the nearest emergency room or urgent care center.  Physician's telephone #: [phone]    If any questions arise, call your doctor.  If your doctor is not available, please feel free to call the Surgical Center at [phone].     A registered nurse may call you a few days after your surgery to see how you are doing after your procedure.    MEDICATIONS: Resume taking daily  medication.  Take prescribed pain medication with food.  If no medication is prescribed, you may take non-aspirin pain medication if needed.  PAIN MEDICATION CAN BE VERY CONSTIPATING.  Take a stool softener or laxative such as senokot, pericolace, or milk of magnesia if needed.    If your physician has prescribed pain medication that includes Acetaminophen (Tylenol), do not take additional Acetaminophen (Tylenol) while taking the prescribed medication.

## 2025-06-04 NOTE — OR NURSING
0831: Pt arrived to PACU. OPA and simple mask 6L in place. surgical dressing placed in OR, CDI. VSS. Pt       0907: Called You limon for updates      0900: Patient is tolerating sips    0915: Patient meets criteria for phase 2. Called report Arabella      0917: Pt transferred to phase 2

## 2025-06-04 NOTE — OP REPORT
Operative Report    Patient: Jamari Acuna    MRN: [id number]    Operation: Cystoscopy and left ureteroscopy with laser lithotripsy and stent insetion    Anesthesia: General    EBL: 0 mL    Surgeon: Abel Saenz MD    Assistant(s): n/a    Preoperative diagnosis: Left UPJ stone    Postoperative diagnosis: Same    Specimen: Left renal stone fragments    Operative indications: Jamari Acuna is a 54 y.o. male with a history of gross hematuria and then intermittent left renal colic, found to have an approximately 6 x 4 mm obstructing left UPJ stone as well as a couple small calcification within the left kidney. Here today for primary ureteroscopy and laser lithotripsy.    Operative details: The patient was brought to the operating room and placed on the operating table in supine position. After administration of anesthesia, he was placed in the dorsal lithotomy position with all pressure points adequately padded, and then prepped and draped in usual sterile fashion. A surgical time-out was performed to confirm patient identity, diagnosis, operative plan including laterality, availability of all required equipment, and administration of proper antibiotic prophylaxis.     After the time-out, the case began by inserting a 22 Kyr[place] rigid cystoscope and carefully inspecting the urethra and the entirety of the bladder. Inspection revealed a normal urethra, no obstructive prostatic hypertrophy, and a normal bladder without trabeculation, diverticula, masses, stones, or other foreign bodies. There was normal efflux of clear yellow urine from the right ureteral orifice, but no efflux witnessed from the left side.     A Sensor wire was advanced into the left ureteral orifice and passed up to the left renal pelvis as seen with fluoroscopy.    A dual lumen catheter was then advanced over the wire into the distal ureter, and diluted contrast solution was used to perform a retrograde pyelogram. This demonstrated a  normal left ureter, a filling defect at the left UPJ, and moderate to severe left hydronephrosis with blunting of the calyces and significant dilatation of the collecting system.    Next, a second Sensor wire was advanced through the dual lumen catheter and up to the renal pelvis. A flexible ureteroscope was then advanced over one of these two wires and up to the proximal ureter.  We immediately encountered the UPJ stone, which was somewhat impacted and did not move with manipulation with the scope or with irrigation. Lithotripsy was then performed. A 200 nm fiber for the makeena holmium laser was passed through the ureteroscope, and settings of 0.2 J and 60 Hz were used to dust the stone. This was done until the stone was freed from its impaction at the UPJ, and passed up into the renal pelvis.Ureteroscopy and pyeloscopy were performed, and using the prior retrograde pyelogram as a guide all calyces of the collecting system were inspected. Examination revealed two fragments of the primary UPJ stone in the renal pelvis, and very small (about 2 mm) stones in the lower pole and midpole. These small stones were dusted with the laser. The two remaining fragments of the primary stone were then deflected into an upper pole calyx, and dusting continued including use of pop-dusting with an increased energy of 0.4J. This was performed until only dust and small sub-millimeter fragments remained. One small fragment was extracted with a zero-tip basket and sent for stone analysis.     The kidney was then closely inspected again to ensure no significant stone fragments remained in any of the calyces or the renal pelvis. The ureteroscope was then withdrawn down the ureter. No stones were encountered in the ureter, and there was no evidence of ureteral injury.     Once the sheath and ureteroscope were removed entirely, the remaining Sensor wire was backloaded through the cystoscope, which was then replaced in the bladder. A 6 Fr x  26 cm double J ureteral stent was advanced over the wire via Seldinger technique, and once the wire was removed there was a good curl of the stent in the left renal pelvis, and a normal curl observed in the bladder.     Before removing the cystoscope the bladder was emptied. The string of the ureteral stent was secured to the penis. The patient was cleaned and dried and placed back into the supine position, and then woken from anesthesia.  The patient was then transferred to the recovery room in stable position.      Abel Saenz MD

## 2025-06-04 NOTE — ANESTHESIA POSTPROCEDURE EVALUATION
Patient: Jamari Acuna    Procedure Summary       Date: 06/04/25 Room / Location:  OR  / SURGERY [facility]    Anesthesia Start: 0728 Anesthesia Stop: 0833    Procedures:       CYSTOSCOPY AND LEFT URETEROSCOPY WITH LASER LITHOTRIPSY AND STENT INSERTION (Urethra)      URETEROSCOPY (Left: Ureter)      LITHOTRIPSY, USING LASER (Left: Ureter) Diagnosis: (LEFT RENAL STONE)    Surgeons: Abel Saenz M.D. Responsible Provider: Rahul Hunt M.D.    Anesthesia Type: general ASA Status: 2            Final Anesthesia Type: general  Last vitals  BP   Blood Pressure: 135/86    Temp   36.2 °C (97.2 °F)    Pulse   71   Resp   14    SpO2   97 %      Anesthesia Post Evaluation    Patient location during evaluation: PACU  Patient participation: complete - patient participated  Level of consciousness: awake and alert  Pain score: 0    Airway patency: patent  Anesthetic complications: no  Cardiovascular status: hemodynamically stable  Respiratory status: acceptable  Hydration status: euvolemic    PONV: none          No notable events documented.     Nurse Pain Score: 0 (NPRS)

## 2025-06-04 NOTE — INTERVAL H&P NOTE
Patient seen and examined in preoperative area; no changes to recent history and physical exam.     Proceed to OR for cystoscopy, left ureteroscopy and laser lithotripsy with stent insertion.

## 2025-06-04 NOTE — ANESTHESIA PROCEDURE NOTES
Airway    Date/Time: 6/4/2025 7:33 AM    Performed by: Rahul Hunt M.D.  Authorized by: Rahul Hunt M.D.    Location:  OR  Urgency:  Elective  Difficult Airway: No    Indications for Airway Management:  Anesthesia      Spontaneous Ventilation: absent    Sedation Level:  Deep  Preoxygenated: Yes    Patient Position:  Sniffing  MILS Maintained Throughout: No    Mask Difficulty Assessment:  0 - not attempted  Final Airway Type:  Supraglottic airway  Final Supraglottic Airway:  Standard LMA    SGA Size:  4  Number of Attempts at Approach:  1  Number of Other Approaches Attempted:  0

## 2025-06-04 NOTE — OR NURSING
Procedure, patient allergies, and NPO status verified. Home med rec reviewed with pt--medications, dosages, and last time taken correct per pt. Belongings secured. Patient verbalizes understanding of pain scale, expected course of stay, and plan of care. Surgical site verified with pt, IV access established, and SCD placed on BLE.

## 2025-06-04 NOTE — ANESTHESIA TIME REPORT
Anesthesia Start and Stop Event Times       Date Time Event    6/4/2025 0703 Ready for Procedure     0728 Anesthesia Start     0833 Anesthesia Stop          Responsible Staff  06/04/25      Name Role Begin End    Rahul Hunt M.D. Anesth 0728 0833          Overtime Reason:  no overtime (within assigned shift)    Comments:

## 2025-06-04 NOTE — OR SURGEON
Immediate Post OP Note    PreOp Diagnosis: Left renal stone      PostOp Diagnosis: Same      Procedure(s):  CYSTOSCOPY AND LEFT URETEROSCOPY WITH LASER LITHOTRIPSY AND STENT INSERTION - Wound Class: Clean Contaminated  URETEROSCOPY - Wound Class: Clean Contaminated  LITHOTRIPSY, USING LASER - Wound Class: Clean Contaminated    Surgeon(s):  Abel Saenz M.D.    Anesthesiologist/Type of Anesthesia:  Anesthesiologist: Rahul Hunt M.D./General    Surgical Staff:  Circulator: Olga Jon R.N.  Operating Room Assistant (ORA): Lisa Silva  Scrub Person: Ezio Louis  Radiology Technologist: Steven Anderson    Specimens removed if any:  ID Type Source Tests Collected by Time Destination   1 : left renal stone Other Other MISCELLANEOUS TEST Abel Saenz M.D. 6/4/2025  8:15 AM        Estimated Blood Loss: 0    Findings: Left UPJ stone causing moderate to severe hydronephrosis; small mid and lower pole stones    Complications: None        6/4/2025 8:28 AM Abel Saenz M.D.

## 2025-06-09 LAB
APPEARANCE STONE: NORMAL
COMPN STONE: NORMAL
SPECIMEN WT: 3 MG

## 2025-06-30 ENCOUNTER — HOSPITAL ENCOUNTER (OUTPATIENT)
Dept: RADIOLOGY | Facility: MEDICAL CENTER | Age: 55
End: 2025-06-30
Payer: COMMERCIAL

## 2025-07-29 ENCOUNTER — OFFICE VISIT (OUTPATIENT)
Dept: UROLOGY | Facility: MEDICAL CENTER | Age: 55
End: 2025-07-29
Payer: COMMERCIAL

## 2025-07-29 DIAGNOSIS — N20.0 NEPHROLITHIASIS: Primary | ICD-10-CM

## 2025-07-29 NOTE — PROGRESS NOTES
Chief Complaint: kidney stones    HPI: Jamari Acuna is a 55 y.o. male with a history of nephrolithiasis, treated with left ureteroscopy and laser lithotripsy on 6/4/2025 and here today for scheduled follow up.     He had what appeared to be an impacted left UPJ stone, as well as a small lower pole stone, which dusted very well. A solitary fragment was extracted and sent for analysis, and returned as 90% calcium oxalate monohydrate and 10% calcium phosphate.     He feels well overall, but had some back pain (primarily on the right side) and had a follow up CT with his PCP; I do not have the images available but the report was found in his primary care physician's recent note, and this details a non-obstructive 3 mm stone fragment in the bottom of the left kidney, without hydronephrosis and without any ureteral stones. The right kidney was normal.     Past Medical History:  Past Medical History[1]    Past Surgical History:  Past Surgical History[2]    Family History:  No family history on file.    Social History:  Social History     Socioeconomic History    Marital status:      Spouse name: Not on file    Number of children: Not on file    Years of education: Not on file    Highest education level: Not on file   Occupational History    Not on file   Tobacco Use    Smoking status: Some Days     Types: Cigarettes     Start date: 1988    Smokeless tobacco: Never    Tobacco comments:     Sometimes smokes   Vaping Use    Vaping status: Never Used   Substance and Sexual Activity    Alcohol use: Yes     Alcohol/week: 12.0 oz     Types: 20 Cans of beer per week     Comment: on the weekends    Drug use: Never    Sexual activity: Not on file   Other Topics Concern    Not on file   Social History Narrative    Not on file     Social Drivers of Health     Financial Resource Strain: Not on file   Food Insecurity: Not on file   Transportation Needs: Not on file   Physical Activity: Not on file   Stress: Not on file    Social Connections: Not on file   Intimate Partner Violence: Not on file   Housing Stability: Not on file       Medications:  Current Medications[3]    Allergies:  Allergies[4]    Review of Systems:  Constitutional: Negative for fever, chills and malaise/fatigue.   HENT: Negative for congestion.    Eyes: Negative for pain.   Respiratory: Negative for cough and shortness of breath.    Cardiovascular: Negative for leg swelling.   Gastrointestinal: Negative for nausea, vomiting, abdominal pain and diarrhea.   Genitourinary: Negative for dysuria and hematuria.   Skin: Negative for rash.   Neurological: Negative for dizziness, focal weakness and headaches.   Endo/Heme/Allergies: Does not bruise/bleed easily.   Psychiatric/Behavioral: Negative for depression.  The patient is not nervous/anxious.        Physical Exam:  There were no vitals filed for this visit.    GENERAL: well appearing, well nourished, NAD  RESP: respiratory effort normal  ABDOMEN: soft, nontender, nondistended, no masses or organomegaly  HERNIAS: no hernias found on exam  SKIN/LYMPH: normal coloration and turgor, no suspicious skin lesions noted  NEURO/PSYCH: alert, oriented, normal speech, no focal findings or movement disorder noted  EXTREMITIES: no pedal edema noted    Data Review:    Labs:  POCT UA   Lab Results   Component Value Date/Time    POCCOLOR yellow 05/27/2025 01:45 PM    POCAPPEAR clear 05/27/2025 01:45 PM    POCLEUKEST neg 05/27/2025 01:45 PM    POCNITRITE neg 05/27/2025 01:45 PM    POCUROBILIGE 0.2 05/27/2025 01:45 PM    POCPROTEIN neg 05/27/2025 01:45 PM    POCURPH 7.5 05/27/2025 01:45 PM    POCBLOOD neg 05/27/2025 01:45 PM    POCSPGRV 1.015 05/27/2025 01:45 PM    POCKETONES neg 05/27/2025 01:45 PM    POCBILIRUBIN neg 05/27/2025 01:45 PM    POCGLUCUA neg 05/27/2025 01:45 PM      CBC   Lab Results   Component Value Date/Time    WBC 6.2 05/31/2025 0738    RBC 5.35 05/31/2025 0738    HEMOGLOBIN 17.0 05/31/2025 0738    HEMATOCRIT 48.9  05/31/2025 0738    MCV 91.4 05/31/2025 0738    MCH 31.8 05/31/2025 0738    MCHC 34.8 05/31/2025 0738    RDW 39.2 05/31/2025 0738    MPV 10.3 05/31/2025 0738    LYMPHOCYTES 20.50 (L) 09/17/2024 0923    LYMPHS 1.44 09/17/2024 0923    MONOCYTES 6.80 09/17/2024 0923    MONOS 0.48 09/17/2024 0923    EOSINOPHILS 1.90 09/17/2024 0923    EOS 0.13 09/17/2024 0923    BASOPHILS 0.60 09/17/2024 0923    BASO 0.04 09/17/2024 0923    NRBC 0.00 09/17/2024 0923       CMP   Lab Results   Component Value Date/Time    SODIUM 137 05/31/2025 0738    POTASSIUM 4.1 05/31/2025 0738    CHLORIDE 103 05/31/2025 0738    CO2 28 05/31/2025 0738    ANION 6.0 (L) 05/31/2025 0738    GLUCOSE 105 (H) 05/31/2025 0738    BUN 16 05/31/2025 0738    CREATININE 0.85 05/31/2025 0738    GFRCKD 103 05/31/2025 0738    CALCIUM 9.5 05/31/2025 0738    CORRCALC 9.3 05/31/2025 0738    ASTSGOT 24 05/31/2025 0738    ALTSGPT 23 05/31/2025 0738    ALKPHOSPHAT 91 05/31/2025 0738    TBILIRUBIN 0.7 05/31/2025 0738    ALBUMIN 4.3 05/31/2025 0738    TOTPROTEIN 7.2 05/31/2025 0738    GLOBULIN 2.9 05/31/2025 0738    AGRATIO 1.5 05/31/2025 0738     [facility]  Order: [id number] - Reflex for Order [id number]   Status: Final result       Next appt: Today at 02:30 PM in Urology (Abel Saenz M.D.)    Test Result Released: No (inaccessible in MyChart)    0 Result Notes      Component  Ref Range & Units (hover) 1 mo ago   Calculi Mass 3   Calculi Description See Note   Comment: Specimen consists of two brown and tan calculi fragments.  The total weight is 3 mg.   Calculi Composition See Note   Comment: Calculi composed primarily of:  90% calcium oxalate monohydrate, and  10% calcium phosphate (hydroxy- and carbonate- apatite).  INTERPRETIVE INFORMATION: Calculi (Stone) analysis  Calculi are the products of physiological processes that yield  crystalline compounds in a matrix of biological compounds and  blood.  Matrix components are not reported.  The  clinically  significant crystalline components identified in calculi specimens  are reported.  Gross description may not be consistent with  composition determined by FTIR analysis.  Performed By: [facility]  [address] [place] [address]  : Mingo Reddy MD, PhD  CLIA Number: [id number]   Resulting Agency [employer]            Imaging:   None    Assessment: 55 y.o.male with a history of calcium oxalate kidney stones, leading to left renal obstruction and pain. Doing well at this time. We discussed the composition of his stones and standard recommendations to limit his risk of stone recurrence. This was his first episode of stones, and we discussed the option to proceed with further metabolic testing with a 24 hour urine study.     -Increase fluid intake to target 2.5 liters of urine output daily; provided graduated cylinder to periodically measure urine output   -Limit dietary sodium (salt) intake to RDV or lower  -Avoid habits of frequent intake of oxalate-rich foods (beans, nuts, tea, spinach, kale, and more; see below references)  -Limit frequency of animal protein in diet  -Increase fruits and vegetables    [email or web address]://[email or web address]/educational-resources/kidney-stones    [email or web address]://[email or web address]/educational-resources/kidney-stones-prevention    [email or web address]://[email or web address].org/educational-materials/kidney-cookbook      Plan:    -Fluid and dietary recommendations as per above  -Repeat renal ultrasound and follow up in 1 year     Abel Saenz MD         [1]   Past Medical History:  Diagnosis Date    Dental disorder     crown. Broken and missing teeth    Depression     Fatty liver     High cholesterol     no meds-instructed to eat healthier    Kidney pain 05/30/2025    pain to back (greater on right side) and penis (constant)    Kidney stones     causes nocturia    Shingles 2017    Snoring    [2]   Past Surgical History:  Procedure Laterality Date    [place]  CYSTOSCOPY,INSERT URETERAL STENT N/A 6/4/2025    Procedure: CYSTOSCOPY AND LEFT URETEROSCOPY WITH LASER LITHOTRIPSY AND STENT INSERTION;  Surgeon: Abel Saenz M.D.;  Location: SURGERY [facility];  Service: Urology    CO CYSTO/URETERO/PYELOSCOPY, DX Left 6/4/2025    Procedure: URETEROSCOPY;  Surgeon: Abel Saenz M.D.;  Location: SURGERY [facility];  Service: Urology    LASERTRIPSY Left 6/4/2025    Procedure: LITHOTRIPSY, USING LASER;  Surgeon: Abel Saenz M.D.;  Location: SURGERY [facility];  Service: Urology    DENTAL EXTRACTION(S)      molar   [3]   Current Outpatient Medications   Medication Sig Dispense Refill    oxybutynin (DITROPAN) 5 MG Tab Take 1 Tablet by mouth 3 times a day as needed (for painful bladder spasms associated with the ureteral stent). 20 Tablet 0    phenazopyridine (PYRIDIUM) 200 MG Tab Take 1 Tablet by mouth 3 times a day as needed for Moderate Pain (For bladder pain associated with ureteral stent). 10 Tablet 0    citalopram (CELEXA) 10 MG tablet Take 10 mg by mouth at bedtime.      omeprazole (PRILOSEC) 20 MG delayed-release capsule 1 capsule 30 minutes before meal Oral Once a day for 30 days (Patient not taking: Reported on 5/30/2025)       No current facility-administered medications for this visit.   [4] No Known Allergies

## (undated) DEVICE — TUBING CLEARLINK DUO-VENT - C-FLO (48EA/CA)

## (undated) DEVICE — SENSOR OXIMETER ADULT SPO2 RD SET (20EA/BX)

## (undated) DEVICE — SUCTION INSTRUMENT YANKAUER BULBOUS TIP W/O VENT (50EA/CA)

## (undated) DEVICE — GOWN WARMING STANDARD FLEX - (30/CA)

## (undated) DEVICE — SPONGE GAUZESTER 4 X 4 4PLY - (128PK/CA)

## (undated) DEVICE — CATHETER URET DUAL LUMEN

## (undated) DEVICE — WIRE GUIDE SENSOR DUAL FLEX - 5/BX

## (undated) DEVICE — GLOVE BIOGEL PI INDICATOR SZ 8.0 SURGICAL PF LF -(50/BX 4BX/CA)

## (undated) DEVICE — PACK CYSTOSCOPY III - (8/CA)

## (undated) DEVICE — BASKET ZERO 1.9FR X 120CM

## (undated) DEVICE — MASK AIRWAY SIZE 4 UNIQUE SILICON (10EA/BX)

## (undated) DEVICE — TOWELS CLOTH SURGICAL - (4/PK 20PK/CA)

## (undated) DEVICE — SCOPE DIGITAL URETEROSCOPE DISPOSABLE (10EA/PK)

## (undated) DEVICE — GATEWAY SIDE ARM ADAPTER (10/BX)

## (undated) DEVICE — SET EXTENSION WITH 2 PORTS (48EA/CA) ***PART #2C8610 IS A SUBSTITUTE*****

## (undated) DEVICE — FIBER LASER MOSES 200 UM (1/EA)

## (undated) DEVICE — WATER IRRIGATION STERILE 1000ML (12EA/CA)

## (undated) DEVICE — LACTATED RINGERS INJ 1000 ML - (14EA/CA 60CA/PF)

## (undated) DEVICE — SODIUM CHL. IRRIGATION 0.9% 3000ML (4EA/CA 65CA/PF)

## (undated) DEVICE — CONTAINER SPECIMEN BAG OR - STERILE 4 OZ W/LID (100EA/CA)

## (undated) DEVICE — BAG URODRAIN WITH TUBING - (20/CA)

## (undated) DEVICE — GLOVE BIOGEL SZ 8 SURGICAL PF LTX - (50PR/BX 4BX/CA)

## (undated) DEVICE — SET LEADWIRE 5 LEAD BEDSIDE DISPOSABLE ECG (1SET OF 5/EA)

## (undated) DEVICE — CLOSURE SKIN STRIP 1/2 X 4 IN - (STERI STRIP) (50/BX 4BX/CA)

## (undated) DEVICE — CANISTER SUCTION 3000ML MECHANICAL FILTER AUTO SHUTOFF MEDI-VAC NONSTERILE LF DISP (40EA/CA)

## (undated) DEVICE — SLEEVE, VASO, THIGH, MED

## (undated) DEVICE — ADHESIVE MASTISOL - (48/BX)